# Patient Record
Sex: FEMALE | Race: BLACK OR AFRICAN AMERICAN | URBAN - METROPOLITAN AREA
[De-identification: names, ages, dates, MRNs, and addresses within clinical notes are randomized per-mention and may not be internally consistent; named-entity substitution may affect disease eponyms.]

---

## 2019-07-28 NOTE — PROGRESS NOTES
2019  Patient Name: Arjun Mahmood  : 1975  Medical Record: [id number]    MEDICATIONS  Current Outpatient Medications   Medication Sig Dispense Refill    fenofibrate (TRICOR) 145 MG tablet Take 145 mg by mouth daily      ibuprofen (ADVIL;MOTRIN) 200 MG tablet Take 600 mg by mouth every 8 hours as needed for Pain      folic acid (FOLVITE) 1 MG tablet Take 1 mg by mouth daily      levothyroxine (SYNTHROID) 50 MCG tablet Take 50 mcg by mouth Daily       No current facility-administered medications for this visit. ALLERGIES  Allergies   Allergen Reactions    Iv Contrast [Iodides] Rash         Comments  No specialty comments available. REFERRING PHYSICIAN: Zachary Fry MD     HISTORY OF PRESENT ILLNESS  Arjun Mahmood is a 53 y.o. female with past medical history of hypothyroidism, dyslipidemia, anemia, pleurisy, pulmonary embolism who is being seen for follow up evaluation of  bilateral lower extremity pain and positive rheumatoid factor. She has pain in both lower extremities and bilateral feet which started 2 years ago and is progressively getting worse. She describes her pain as 10 out of 10, aching/sore, on daily basis, without any significant relieving or aggravating factors. She also has on and off low back pain twice a week lasting for 20 minutes at a time. Pain intermittently radiates to right thigh. She has occasional tingling and numbness in legs. She denies any weakness. She denies any bowel or bladder dysfunction. She denies any change in the symptoms with exercise or rest.  She was placed on Cymbalta by primary care physician which caused jitteriness. She takes ibuprofen over-the-counter 600 mg as needed without any significant relief in pain. She gets occasional achiness in her hands with overactivity. She denies any swelling. She has a morning stiffness lasting for few minutes. She has intermittent swelling in her ankles.   She also has a history of pulmonary Date    Anemia     Pleurisy     Pulmonary embolism ([facility] [address]     Thyroid disease      Past Surgical History:   Procedure Laterality Date    MOUTH SURGERY      TUBAL LIGATION       Social History     Socioeconomic History    Marital status: Single     Spouse name: Not on file    Number of children: Not on file    Years of education: Not on file    Highest education level: Not on file   Occupational History    Not on file   Social Needs    Financial resource strain: Not on file    Food insecurity:     Worry: Not on file     Inability: Not on file    Transportation needs:     Medical: Not on file     Non-medical: Not on file   Tobacco Use    Smoking status: Former Smoker     Types: Cigarettes     Last attempt to quit: 7/10/2019     Years since quittin.0    Smokeless tobacco: Never Used    Tobacco comment: max of 3 cigs per wk   Substance and Sexual Activity    Alcohol use: Yes     Comment: occ    Drug use: No    Sexual activity: Not on file   Lifestyle    Physical activity:     Days per week: Not on file     Minutes per session: Not on file    Stress: Not on file   Relationships    Social connections:     Talks on phone: Not on file     Gets together: Not on file     Attends [employer] service: Not on file     Active member of club or organization: Not on file     Attends meetings of clubs or organizations: Not on file     Relationship status: Not on file    Intimate partner violence:     Fear of current or ex partner: Not on file     Emotionally abused: Not on file     Physically abused: Not on file     Forced sexual activity: Not on file   Other Topics Concern    Not on file   Social History Narrative    Not on file     History reviewed. No pertinent family history.       PHYSICAL EXAM   Vitals:    19 1355   BP: 132/87   Site: Left Lower Arm   Pulse: 60   Weight: 188 lb (85.3 kg)   Height: 5' 5.5\" (1.664 m)     Physical Exam  Constitutional:  Well developed, well nourished, no acute telangiectasias  Lymphatic:  No lymphadenopathy noted   Neurologic:   Alert & oriented x 3, CN 2-12 normal, no focal deficits noted. Sensations Intact. Muscle strength 5/5 proximallyand distally in upper and lower extremities. Psychiatric:  Speech and behavior appropriate           LABS AND IMAGING  Outside data reviewed and in HPI    Lab Results   Component Value Date    WBC 5.9 09/09/2016    RBC 3.98 09/09/2016    HGB 11.7 09/09/2016    HCT 35.4 09/09/2016     09/09/2016    MCV 89.1 09/09/2016    MCH 29.4 09/09/2016    MCHC 33.0 09/09/2016    RDW 14.3 09/09/2016    LYMPHOPCT 29.0 09/09/2016    MONOPCT 7.9 09/09/2016    BASOPCT 0.7 09/09/2016    MONOSABS 0.5 09/09/2016    LYMPHSABS 1.7 09/09/2016    EOSABS 0.2 09/09/2016    BASOSABS 0.0 09/09/2016       Chemistry        Component Value Date/Time     09/09/2016 2237    K 3.7 09/09/2016 2237     09/09/2016 2237    CO2 23 09/09/2016 2237    BUN 13 09/09/2016 2237    CREATININE 0.9 09/09/2016 2237        Component Value Date/Time    CALCIUM 10.1 09/09/2016 2237    ALKPHOS 58 05/03/2016 1630    AST 19 05/03/2016 1630    ALT 10 05/03/2016 1630    BILITOT <0.2 05/03/2016 1630          No results found for: SEDRATE  No results found for: CRP  No results found for: JAY, SURYA, SSA, SSB, C3, C4  No results found for: RF, CCPABIGG  No results found for: JAY, ANATITER, ANAINT, PATH  No results found for: DSDNAG, DSDNAIGGIFA  No results found for: SSAROAB, SSALAAB  No results found for: SMAB, RNPAB  No results found for: CENTABIGG  No results found for: C3, C4, ACE  No results found for: JO1, VITD25, ABR26VEQPH  No results found for: Deny Stefano  No results found for: [id number]  No results found for: TSHFT4, TSH  No results found for: VITD25    Radiology:    Bone scan 5/13/19  IMPRESSION:    1.  Symmetric radiotracer activity in the rib cage as well as along the cervical, thoracic, and lumbar spine.  No clear etiology for the patient's chest pain is pain syndrome  -Bone scan with mild degenerative changes without any evidence of inflammation  -Bilateral lower extremity EMGs normal  -I will do work-up to rule out metabolic, myopathy, autoimmune etiology  -Obtain lumbar spine x-ray to rule out degenerative disc disease/spinal stenosis  -Continue ibuprofen 600 mg as needed in the meantime  -     CBC Auto Differential; Future  -     Comprehensive Metabolic Panel; Future  -     C-Reactive Protein; Future  -     Sedimentation Rate; Future  -     CK; Future  -     TSH WITH REFLEX TO FT4; Future  -     Vitamin B12; Future  -     Vitamin D 25 Hydroxy; Future  -     XR LUMBAR SPINE (2-3 VIEWS); Future         The patient indicates understanding of these issues and agrees with the plan. Return in about 3 weeks (around 8/19/2019). The risks and benefits of my recommendations, as well as other treatment options, benefits and side effects werediscussed with the patient. All questions were answered. I reviewed patient's history, referral documents and electronic medical records  Copy of consult note is being routedelectronically/faxed to referring physician         ######################################################################    I thank you for giving me theopportunity to participate in [facility] care. If you have any questions or concerns please feel free to contact me. I look forward to following  Ame along with you. Electronically signed by: Chandni Browne MD, 7/29/2019 2:28 PM    Documentation was done using voice recognition dragon software. Every effort was made to ensure accuracy;however, inadvertent unintentional computerized transcription errors may be present.

## 2019-07-29 ENCOUNTER — OFFICE VISIT (OUTPATIENT)
Dept: RHEUMATOLOGY | Age: 44
End: 2019-07-29
Payer: COMMERCIAL

## 2019-07-29 VITALS
DIASTOLIC BLOOD PRESSURE: 87 MMHG | SYSTOLIC BLOOD PRESSURE: 132 MMHG | HEART RATE: 60 BPM | BODY MASS INDEX: 30.22 KG/M2 | WEIGHT: 188 LBS | HEIGHT: 66 IN

## 2019-07-29 DIAGNOSIS — M79.604 PAIN IN BOTH LOWER EXTREMITIES: ICD-10-CM

## 2019-07-29 DIAGNOSIS — R76.8 ELEVATED RHEUMATOID FACTOR: ICD-10-CM

## 2019-07-29 DIAGNOSIS — M79.605 PAIN IN BOTH LOWER EXTREMITIES: ICD-10-CM

## 2019-07-29 DIAGNOSIS — R76.8 ELEVATED RHEUMATOID FACTOR: Primary | ICD-10-CM

## 2019-07-29 PROCEDURE — G8417 CALC BMI ABV UP PARAM F/U: HCPCS | Performed by: INTERNAL MEDICINE

## 2019-07-29 PROCEDURE — G8427 DOCREV CUR MEDS BY ELIG CLIN: HCPCS | Performed by: INTERNAL MEDICINE

## 2019-07-29 PROCEDURE — 99244 OFF/OP CNSLTJ NEW/EST MOD 40: CPT | Performed by: INTERNAL MEDICINE

## 2019-07-29 RX ORDER — IBUPROFEN 200 MG
600 TABLET ORAL EVERY 8 HOURS PRN
COMMUNITY
End: 2019-11-11

## 2019-07-29 RX ORDER — FOLIC ACID 1 MG/1
1 TABLET ORAL DAILY
COMMUNITY
End: 2021-10-11 | Stop reason: SDUPTHER

## 2019-07-29 RX ORDER — FENOFIBRATE 145 MG/1
145 TABLET, COATED ORAL DAILY
COMMUNITY

## 2019-07-29 NOTE — LETTER
[facility] Rheumat[facility]  [address] [id number]  Phone: [phone]  Fax: [phone]    Brent Escobedo MD        July 29, 2019     Patient: Bethanie Tejada  MR Number: [id number]  YOB: 1975  Date of Visit: 7/29/2019    Dear Dr. Tanya Simmons: Thank you for your referral. Progress note attached in visit summary. If you have questions, please do not hesitate to call me. I look forward to following Ame along with you.     Sincerely,        Brent Escobedo MD

## 2019-07-30 DIAGNOSIS — E55.9 VITAMIN D DEFICIENCY: Primary | ICD-10-CM

## 2019-07-30 LAB
A/G RATIO: 1.6 (ref 1.1–2.2)
ALBUMIN SERPL-MCNC: 4.1 G/DL (ref 3.4–5)
ALP BLD-CCNC: 81 U/L (ref 40–129)
ALT SERPL-CCNC: 7 U/L (ref 10–40)
ANION GAP SERPL CALCULATED.3IONS-SCNC: 15 MMOL/L (ref 3–16)
ANTI-NUCLEAR ANTIBODY (ANA): NEGATIVE
ANTI-SS-A IGG: <0.2 AI (ref 0–0.9)
ANTI-SS-B IGG: <0.2 AI (ref 0–0.9)
AST SERPL-CCNC: 13 U/L (ref 15–37)
BASOPHILS ABSOLUTE: 0 K/UL (ref 0–0.2)
BASOPHILS RELATIVE PERCENT: 0.8 %
BILIRUB SERPL-MCNC: <0.2 MG/DL (ref 0–1)
BUN BLDV-MCNC: 18 MG/DL (ref 7–20)
C-REACTIVE PROTEIN: 1.6 MG/L (ref 0–5.1)
CALCIUM SERPL-MCNC: 10.1 MG/DL (ref 8.3–10.6)
CHLORIDE BLD-SCNC: 109 MMOL/L (ref 99–110)
CO2: 21 MMOL/L (ref 21–32)
CREAT SERPL-MCNC: 1 MG/DL (ref 0.6–1.1)
CYCLIC CITRULLINATED PEPTIDE ANTIBODY IGG: 11.2 U/ML (ref 0–2.9)
EOSINOPHILS ABSOLUTE: 0.4 K/UL (ref 0–0.6)
EOSINOPHILS RELATIVE PERCENT: 6.9 %
GFR AFRICAN AMERICAN: >60
GFR NON-AFRICAN AMERICAN: 60
GLOBULIN: 2.5 G/DL
GLUCOSE BLD-MCNC: 94 MG/DL (ref 70–99)
HCT VFR BLD CALC: 34.6 % (ref 36–48)
HEMOGLOBIN: 11.5 G/DL (ref 12–16)
HEPATITIS B CORE IGM ANTIBODY: NORMAL
HEPATITIS B SURFACE ANTIGEN INTERPRETATION: NORMAL
HEPATITIS C ANTIBODY INTERPRETATION: NORMAL
LYMPHOCYTES ABSOLUTE: 1.9 K/UL (ref 1–5.1)
LYMPHOCYTES RELATIVE PERCENT: 35.8 %
MCH RBC QN AUTO: 30.5 PG (ref 26–34)
MCHC RBC AUTO-ENTMCNC: 33.2 G/DL (ref 31–36)
MCV RBC AUTO: 91.9 FL (ref 80–100)
MONOCYTES ABSOLUTE: 0.4 K/UL (ref 0–1.3)
MONOCYTES RELATIVE PERCENT: 8.1 %
NEUTROPHILS ABSOLUTE: 2.6 K/UL (ref 1.7–7.7)
NEUTROPHILS RELATIVE PERCENT: 48.4 %
PDW BLD-RTO: 12.7 % (ref 12.4–15.4)
PLATELET # BLD: 383 K/UL (ref 135–450)
PMV BLD AUTO: 8.1 FL (ref 5–10.5)
POTASSIUM SERPL-SCNC: 4.5 MMOL/L (ref 3.5–5.1)
RBC # BLD: 3.77 M/UL (ref 4–5.2)
RHEUMATOID FACTOR: 44 IU/ML
SEDIMENTATION RATE, ERYTHROCYTE: 24 MM/HR (ref 0–20)
SODIUM BLD-SCNC: 145 MMOL/L (ref 136–145)
TOTAL CK: 113 U/L (ref 26–192)
TSH REFLEX FT4: 0.6 UIU/ML (ref 0.27–4.2)
VITAMIN B-12: 1123 PG/ML (ref 211–911)
VITAMIN D 25-HYDROXY: 9.9 NG/ML
WBC # BLD: 5.4 K/UL (ref 4–11)

## 2019-07-30 RX ORDER — ERGOCALCIFEROL 1.25 MG/1
50000 CAPSULE ORAL WEEKLY
Qty: 4 CAPSULE | Refills: 2 | Status: SHIPPED | OUTPATIENT
Start: 2019-07-30 | End: 2019-07-31 | Stop reason: SDUPTHER

## 2019-07-30 RX ORDER — CHOLECALCIFEROL (VITAMIN D3) 50 MCG
2000 TABLET ORAL DAILY
Qty: 30 TABLET | Refills: 11 | Status: SHIPPED | OUTPATIENT
Start: 2019-07-30 | End: 2019-07-31 | Stop reason: SDUPTHER

## 2019-07-31 ENCOUNTER — TELEPHONE (OUTPATIENT)
Dept: RHEUMATOLOGY | Age: 44
End: 2019-07-31

## 2019-07-31 DIAGNOSIS — E55.9 VITAMIN D DEFICIENCY: ICD-10-CM

## 2019-07-31 LAB
ALBUMIN SERPL-MCNC: 3.2 G/DL (ref 3.1–4.9)
ALPHA-1-GLOBULIN: 0.2 G/DL (ref 0.2–0.4)
ALPHA-2-GLOBULIN: 1 G/DL (ref 0.4–1.1)
BETA GLOBULIN: 1.2 G/DL (ref 0.9–1.6)
GAMMA GLOBULIN: 1.1 G/DL (ref 0.6–1.8)
SPE/IFE INTERPRETATION: NORMAL
TOTAL PROTEIN: 6.6 G/DL (ref 6.4–8.2)

## 2019-07-31 RX ORDER — CHOLECALCIFEROL (VITAMIN D3) 50 MCG
2000 TABLET ORAL DAILY
Qty: 30 TABLET | Refills: 11 | Status: SHIPPED | OUTPATIENT
Start: 2019-10-23 | End: 2020-08-18

## 2019-07-31 RX ORDER — ERGOCALCIFEROL 1.25 MG/1
50000 CAPSULE ORAL WEEKLY
Qty: 4 CAPSULE | Refills: 2 | Status: SHIPPED | OUTPATIENT
Start: 2019-07-31 | End: 2020-04-06

## 2019-07-31 NOTE — TELEPHONE ENCOUNTER
Notes recorded by Mariluz Norris on 7/31/2019 at 4:20 PM EDT  Pt informed. Pended Rx's to alternated pharmacy per pt req  ------    Notes recorded by Mariluz Norris on 7/31/2019 at 7:20 AM EDT  [facility])    Notes recorded by Anjelica Del Toro MD on 7/29/2019 at 10:17 PM EDT  Lumbar spine xrays show mild degenerative changes  ------    Notes recorded by Anjelica Del Toro MD on 7/30/2019 at 9:30 AM EDT  Please call the patient and let her know that her blood work is consistent with rheumatoid arthritis. Tere Zaman will discuss treatment options at follow-up appointment. Janneth Dacosta also has vitamin D deficiency.  I will send in a prescription to pharmacy for vitamin D replacement.

## 2019-08-07 ENCOUNTER — TELEPHONE (OUTPATIENT)
Dept: RHEUMATOLOGY | Age: 44
End: 2019-08-07

## 2019-08-18 NOTE — PROGRESS NOTES
or bladder dysfunction. She denies any change in the symptoms with exercise or rest.  She was placed on Cymbalta by primary care physician which caused jitteriness. She takes ibuprofen over-the-counter 600 mg as needed without any significant relief in pain. She gets occasional achiness in her hands with overactivity. She denies any swelling. She has a morning stiffness lasting for few minutes. She has intermittent swelling in her ankles. She also has a history of pulmonary embolism in 2015. She was on warfarin for 2 years. She denies family history of rheumatoid arthritis. She denies psoriasis, inflammatory bowel disease, inflammatory back pain, dactylitis, enthesitis, tenosynovitis and uveitis. She had bilateral lower extremity EMG which was normal.  She also had bone scan which showed mild degenerative changes. She had blood work which showed RF 44.8    Interim history: She presents for follow-up of joint pain. She continues to complain of pain in her feet and legs. She denies any weakness in the legs. She had lumbar spine x-ray which showed mild degenerative changes. She denies any joint swelling. She has morning stiffness lasting for few minutes. She tried Cymbalta for leg pain without any benefit. She had EMG of bilateral lower extremities which was normal.  She also had a bone scan which showed mild degenerative changes. Her blood work showed positive RF, CCP and low vitamin D. She is on vitamin D replacement. HPI  Review of Systems    REVIEW OF SYSTEMS: History of pulmonary embolism, pleurisy  Constitutional: No unanticipated weight loss or fevers. No fatigue and malaise.   Integumentary: No rash, photosensitivity, malar rash, livedo reticularis, alopecia and Raynaud's symptoms, sclerodactyly, skin tightening  Eyes: negative for visual disturbance and persistent redness, discharge from eyes   ENT: - No tinnitus, loss of hearing, vertigo, or recurrent ear infections.  - No history of nasal/oral ulcers. - No history of dry eyes/dry mouth  Cardiovascular: No history of pericarditis, chest pain or murmur or palpitations  Respiratory: No shortness of breath, cough or history of interstitial lung disease. No history of tuberculosis or atypical infections. Gastrointestinal: No history of heart burn, dysphagia or esophageal dysmotility. No change in bowel habits or any inflammatory bowel disease. Genitourinary: No history renal disease, miscarriages. Hematologic/Lymphatic: No abnormal bruising or bleeding, swollen lymph nodes. Neurological: No history of headaches, seizure or focal weakness. No history of neuropathies, paresthesias or hyperesthesias, facial droop, diplopia  Psychiatric: No history of bipolar disease, anxiety, depression  Endocrine: Denies any polyuria, polydipsia and osteoporosis  Allergic/Immunologic: No nasal congestion or hives. I have reviewed patients Past medical History, Social History and Family History as mentioned in her chart and this remains unchanged fromprevious.     Past Medical History:   Diagnosis Date    Anemia     Pleurisy     Pulmonary embolism ([facility] [address]     Thyroid disease      Past Surgical History:   Procedure Laterality Date    MOUTH SURGERY      TUBAL LIGATION       Social History     Socioeconomic History    Marital status: Single     Spouse name: Not on file    Number of children: Not on file    Years of education: Not on file    Highest education level: Not on file   Occupational History    Not on file   Social Needs    Financial resource strain: Not on file    Food insecurity:     Worry: Not on file     Inability: Not on file    Transportation needs:     Medical: Not on file     Non-medical: Not on file   Tobacco Use    Smoking status: Former Smoker     Types: Cigarettes     Last attempt to quit: 7/10/2019     Years since quittin.1    Smokeless tobacco: Never Used    Tobacco comment: max of 3 cigs per wk   Substance and Sexual

## 2019-08-19 ENCOUNTER — OFFICE VISIT (OUTPATIENT)
Dept: RHEUMATOLOGY | Age: 44
End: 2019-08-19
Payer: COMMERCIAL

## 2019-08-19 VITALS
BODY MASS INDEX: 29.89 KG/M2 | HEIGHT: 66 IN | HEART RATE: 81 BPM | SYSTOLIC BLOOD PRESSURE: 133 MMHG | DIASTOLIC BLOOD PRESSURE: 84 MMHG | WEIGHT: 186 LBS

## 2019-08-19 DIAGNOSIS — M79.18 MYOFASCIAL PAIN: ICD-10-CM

## 2019-08-19 DIAGNOSIS — M05.79 RHEUMATOID ARTHRITIS INVOLVING MULTIPLE SITES WITH POSITIVE RHEUMATOID FACTOR (HCC): Primary | ICD-10-CM

## 2019-08-19 PROCEDURE — 1036F TOBACCO NON-USER: CPT | Performed by: INTERNAL MEDICINE

## 2019-08-19 PROCEDURE — G8427 DOCREV CUR MEDS BY ELIG CLIN: HCPCS | Performed by: INTERNAL MEDICINE

## 2019-08-19 PROCEDURE — G8417 CALC BMI ABV UP PARAM F/U: HCPCS | Performed by: INTERNAL MEDICINE

## 2019-08-19 PROCEDURE — 99214 OFFICE O/P EST MOD 30 MIN: CPT | Performed by: INTERNAL MEDICINE

## 2019-08-19 RX ORDER — HYDROXYCHLOROQUINE SULFATE 200 MG/1
200 TABLET, FILM COATED ORAL 2 TIMES DAILY
Qty: 60 TABLET | Refills: 5 | Status: SHIPPED | OUTPATIENT
Start: 2019-08-19 | End: 2019-11-11 | Stop reason: SDUPTHER

## 2019-08-19 RX ORDER — PREDNISONE 1 MG/1
TABLET ORAL
Qty: 105 TABLET | Refills: 0 | Status: SHIPPED | OUTPATIENT
Start: 2019-08-19 | End: 2019-11-11

## 2019-09-04 ENCOUNTER — HOSPITAL ENCOUNTER (OUTPATIENT)
Dept: PHYSICAL THERAPY | Age: 44
Setting detail: THERAPIES SERIES
Discharge: HOME | End: 2019-09-04
Payer: COMMERCIAL

## 2019-09-04 PROCEDURE — 97110 THERAPEUTIC EXERCISES: CPT

## 2019-09-04 PROCEDURE — 97530 THERAPEUTIC ACTIVITIES: CPT

## 2019-09-04 PROCEDURE — 97161 PT EVAL LOW COMPLEX 20 MIN: CPT

## 2019-09-04 ASSESSMENT — PAIN DESCRIPTION - FREQUENCY: FREQUENCY: CONTINUOUS

## 2019-09-04 ASSESSMENT — PAIN DESCRIPTION - ORIENTATION: ORIENTATION: RIGHT;LEFT

## 2019-09-04 ASSESSMENT — PAIN DESCRIPTION - LOCATION: LOCATION: LEG

## 2019-09-04 ASSESSMENT — PAIN DESCRIPTION - PROGRESSION: CLINICAL_PROGRESSION: GRADUALLY WORSENING

## 2019-09-04 ASSESSMENT — PAIN DESCRIPTION - PAIN TYPE: TYPE: CHRONIC PAIN

## 2019-09-04 ASSESSMENT — PAIN DESCRIPTION - DESCRIPTORS: DESCRIPTORS: ACHING;CONSTANT;BURNING

## 2019-09-04 ASSESSMENT — PAIN SCALES - GENERAL: PAINLEVEL_OUTOF10: 10

## 2019-09-04 ASSESSMENT — PAIN DESCRIPTION - DIRECTION: RADIATING_TOWARDS: BILATERAL FEET

## 2019-09-04 NOTE — FLOWSHEET NOTE
Circles X   Squats  X PNF Diagonals    Hamstring Curls X Wall Push Ups X   Hip Flexion (SLR) X Figure 8's    Hip aBduction (SLR) X Bilateral Pull Downs    Hip Extension (SLR) X      Hip aDduction (SLR)      Hip Circles X Functional:    Hip IR/Er X Step up forward X   TrA Set  X Step up lateral  X   Pelvic Tilts   Step down     Fig 8's  X Lunges Forward X   LE PNF  Lunges Retro      Lunges Lateral     Balance:   Lower ab curl with noodle     SLS  X      Tandem Stance X      NBOS eyes open  Seated:     NBOS eyes closed  Ankle pumps     Hand to Opposite Knee X Ankle Circles     Fwd Step ups to SLS  Knee Flex/Ext    Lateral Step ups to SLS  Hip aBd/aDd    Stop/Go Gait   Bicycle       Ankle DF/PF      Ankle Inv/Ev    Stretching:       Gastroc/Soleus X     Hamstring  X Noodle:     Knee Flex Stretch  Leg Press X   Piriformis  X Noodle Hang at [place]    Hip Flexor  Noodle Hang Deep Water X   SKTC X Noodle Bicycle     DKTC       ITB      Quad  Deep Water:    Mid Back   Jog    UT  Jumping Jacks    Post Shoulder  Heel to Toe    Ladder Pull  Hand to Opposite Knee    Pec Stretch  Rocking Horse      [employer] Skier          Cervical:   Other:     AROM Flexion      AROM Extension      AROM Side Bending      AROM Rotation      Chin Tucks      Chin Nods        Aquatic Abbreviation Key  B= Belt DB= Dumbells T= Theratube   H= Hydrotone N= Noodles W= Weights   P= Paddles S= Speedo equipment K= Kickboard       Pt. Education:  -pt educated on diagnosis, prognosis and expectations for rehab  -all pt questions were answered  -Gave pt tour of pool, explained how to use lockers, what to wear, that it would be in group setting, and showed pt aquatic equipment.     HEP instruction:  -pt provided with written and illustrated instructions for HEP (see scanned image in )  9/4  SKTC, piriformis stretch, supine hamstring stretch, standing hamstring stretch, bridging, prone press up    Therapeutic Exercise and NMR EXR  [] (14751) Provided verbal/tactile cueing for activities related to strengthening, flexibility, endurance, ROM for improvements in  [] LE / Lumbar: LE, proximal hip, and core control with self care, mobility, lifting, ambulation. [] UE / Cervical: cervical, postural, scapular, scapulothoracic and UE control with self care, reaching, carrying, lifting, house/yardwork, driving, computer work.  [] (10305) Provided verbal/tactile cueing for activities related to improving balance, coordination, kinesthetic sense, posture, motor skill, proprioception to assist with   [] LE / lumbar: LE, proximal hip, and core control in self care, mobility, lifting, ambulation and eccentric single leg control. [] UE / cervical: cervical, scapular, scapulothoracic and UE control with self care, reaching, carrying, lifting, house/yardwork, driving, computer work.   [] (22338) Aquatic therapy with therapeutic exercise. Provided verbal and tactile cueing for activities related to strengthening, flexibility, endurance, ROM for improvements in  [] LE / lumbar: LE, proximal hip, and core control in self care, mobility, lifting, ambulation and eccentric single leg control. [] UE / cervical: cervical, scapular, scapulothoracic and UE control with self care, reaching, carrying, lifting, house/yardwork, driving, computer work.   [] (08517) Therapist is in constant attendance of 2 or more patients providing skilled therapy interventions, but not providing any significant amount of measurable one-on-one time to either patient, for improvements in  [] LE / lumbar: LE, proximal hip, and core control in self care, mobility, lifting, ambulation and eccentric single leg control. [] UE / cervical: cervical, scapular, scapulothoracic and UE control with self care, reaching, carrying, lifting, house/yardwork, driving, computer work.      NMR and Therapeutic Activities:    [] (71378 or ) Provided verbal/tactile cueing for activities related to improving progress  [] Patient is not progressing as expected and requires additional follow up with physician  [] Other    Persisting Functional Limitations/Impairments:  []Sleeping [x]Sitting               [x]Standing []Transfers        []Walking []Kneeling               []Stairs [x]Squatting / bending   [x]ADLs [x]Reaching  [x]Lifting  [x]Housework  []Driving []Job related tasks  []Sports/Recreation []Other:        ASSESSMENT:  See eval  Treatment/Activity Tolerance:  [] Patient able to complete tx [] Patient limited by fatique  [] Patient limited by pain  [] Patient limited by other medical complications  [] Other:     Prognosis: [x] Good [] Fair  [] Poor    Patient Requires Follow-up: [x] Yes  [] No         PLAN: See eval. PT 2x / week for 8 weeks. [] Continue per plan of care [] Alter current plan (see comments)  [x] Plan of care initiated [] Hold pending MD visit [] Discharge    Electronically signed by: Christian Benavides PT    Note: If patient does not return for scheduled/ recommended follow up visits, his note will serve as a discharge from care along with most recent update on progress.

## 2019-09-11 ENCOUNTER — HOSPITAL ENCOUNTER (OUTPATIENT)
Dept: PHYSICAL THERAPY | Age: 44
Setting detail: THERAPIES SERIES
Discharge: HOME | End: 2019-09-11
Payer: COMMERCIAL

## 2019-09-11 PROCEDURE — 97150 GROUP THERAPEUTIC PROCEDURES: CPT

## 2019-09-11 PROCEDURE — 97113 AQUATIC THERAPY/EXERCISES: CPT

## 2019-09-11 NOTE — FLOWSHEET NOTE
[facility] - Outpatient Physical Therapy    Physical Therapy Daily Treatment Note  Date:  2019    Patient Name:  Jessica Herrmann    :  1975  MRN: [id number]  Medical/Treatment Diagnosis Information:  · Diagnosis: Myofascial Pain M79.18  · Treatment Diagnosis: Impaired LE strength, increased pain, decreased tolerance standing, sitting, myofascial pain   Insurance/Certification information:  PT Insurance Information: [employer] 30 visits/year   Physician Information:  Referring Practitioner: Sirena Velazquez  Plan of care signed (Y/N):     Date of Patient follow up with Physician:     Functional scale: LEFS    Progress Note: [x]  Yes  []  No  Next due by: Visit #10  (land)     Latex Allergy:  [x]NO      []YES  Preferred Language for Healthcare:   [x]English       []other:    Visit # Insurance Allowable Date Range (if applicable)   0/33  30 n/a     Pain level:  4/10 BILATERAL LEGS     SUBJECTIVE: feeling pretty good today. OBJECTIVE: See eval      RESTRICTIONS/PRECAUTIONS:     Land Based Treatment Dates:   Exercises/Interventions:     Therapeutic Exercises () Resistance / level Sets/sec Reps Notes                                                                         Therapeutic Activities (09634)                                   Neuromuscular Re-ed (09648)                                                 Manual Intervention (77030)                                                     AquaticTherapy Dates of Service:   Aquatic Visits Exercises/Activities:,  [id number] and / or 37606   Transfers:          % Immersion:            Ambulation:   UE Exercises:       Forwards   X 1 lap Shoulder Shrugs      Lateral   X 1 lap Shoulder Circles      Retro   X 1 lap Scapular Retraction       Marching   Push Downs       [place]   Punching     Jog    Rowing     Multifidi walkouts with paddle  X Elbow Flex/Ext       Shldr Flex/Ext X 10      Shldr aBd/aDd X 10   LE Exercises:  [employer] stretch, standing hamstring stretch, bridging, prone press up    Therapeutic Exercise and NMR EXR  [] (12118) Provided verbal/tactile cueing for activities related to strengthening, flexibility, endurance, ROM for improvements in  [] LE / Lumbar: LE, proximal hip, and core control with self care, mobility, lifting, ambulation. [] UE / Cervical: cervical, postural, scapular, scapulothoracic and UE control with self care, reaching, carrying, lifting, house/yardwork, driving, computer work.  [] (22987) Provided verbal/tactile cueing for activities related to improving balance, coordination, kinesthetic sense, posture, motor skill, proprioception to assist with   [] LE / lumbar: LE, proximal hip, and core control in self care, mobility, lifting, ambulation and eccentric single leg control. [] UE / cervical: cervical, scapular, scapulothoracic and UE control with self care, reaching, carrying, lifting, house/yardwork, driving, computer work. [x] (01917) Aquatic therapy with therapeutic exercise. Provided verbal and tactile cueing for activities related to strengthening, flexibility, endurance, ROM for improvements in  [x] LE / lumbar: LE, proximal hip, and core control in self care, mobility, lifting, ambulation and eccentric single leg control. [] UE / cervical: cervical, scapular, scapulothoracic and UE control with self care, reaching, carrying, lifting, house/yardwork, driving, computer work. [x] (60854) Therapist is in constant attendance of 2 or more patients providing skilled therapy interventions, but not providing any significant amount of measurable one-on-one time to either patient, for improvements in  [x] LE / lumbar: LE, proximal hip, and core control in self care, mobility, lifting, ambulation and eccentric single leg control. [] UE / cervical: cervical, scapular, scapulothoracic and UE control with self care, reaching, carrying, lifting, house/yardwork, driving, computer work.      NMR and with   [] LE / lumbar: self care, mobility, lifting and ambulation. [] UE / Cervical: self care, reaching, carrying, lifting, house/yardwork, driving, computer work. Modalities:  [] (40587) Vasopneumatic compression: Utilized vasopneumatic compression to decrease edema / swelling for the purpose of improving mobility and quad tone / recruitment which will allow for increased overall function including but not limited to self-care, transfers, ambulation, and ascending / descending stairs. Modalities:      Charges:  Timed Code Treatment Minutes: 15   Total Treatment Minutes: 34     [] EVAL - LOW (83182)   [] EVAL - MOD (48551)  [] EVAL - HIGH (77693)  [] RE-EVAL (00829)  [] TE (05179) x 1     [] Ionto  [] NMR (59393) x      [] Vaso  [] Manual (05476) x      [] Ultrasound  [] TA x   1    [] Mech Traction (72596)  [] Gait Training x     [] ES (un) (81933):   [x] Aquatic therapy x   [] Other:   [x] Group:     GOALS: Long term goals  Time Frame for Long term goals : upon discharge  Long term goal 1: Pt will be IND with general debility HEP protocol with reference to written instructions, to achieve maximal level of functional mobility and independence   Long term goal 2: Pt will reduce pain to 5/10 at worst in LE and back in order to help allow patient to improve tolerance to performing daily activities around her home  Long term goal 3: Pt will improve single leg stance RLE to 20 secs in order to help reduce risk for falls and risk for LE buckling   Long term goal 4: Pt will improve tolerance to sitting/standing to 1 hour continuously in order to allow patient to return to all ADL's, iADL's within her home without limitations     Overall Progression Towards Functional goals/ Treatment Progress Update:  [] Patient is progressing as expected towards functional goals listed. [] Progression is slowed due to complexities/Impairments listed. [] Progression has been slowed due to co-morbidities.   [x] Plan just implemented, too soon to assess goals progression <30days   [] Goals require adjustment due to lack of progress  [] Patient is not progressing as expected and requires additional follow up with physician  [] Other    Persisting Functional Limitations/Impairments:  []Sleeping [x]Sitting               [x]Standing []Transfers        []Walking []Kneeling               []Stairs [x]Squatting / bending   [x]ADLs [x]Reaching  [x]Lifting  [x]Housework  []Driving []Job related tasks  []Sports/Recreation []Other:        ASSESSMENT:  See eval  Treatment/Activity Tolerance:  [] Patient able to complete tx [] Patient limited by fatique  [] Patient limited by pain  [] Patient limited by other medical complications  [] Other:     Prognosis: [x] Good [] Fair  [] Poor    Patient Requires Follow-up: [x] Yes  [] No         PLAN: See eval. PT 2x / week for 8 weeks. [x] Continue per plan of care [] Alter current plan (see comments)  [] Plan of care initiated [] Hold pending MD visit [] Discharge    Electronically signed by: Juan Ramon Arboleda PT    Note: If patient does not return for scheduled/ recommended follow up visits, his note will serve as a discharge from care along with most recent update on progress.

## 2019-09-17 ENCOUNTER — HOSPITAL ENCOUNTER (OUTPATIENT)
Dept: PHYSICAL THERAPY | Age: 44
Setting detail: THERAPIES SERIES
Discharge: HOME | End: 2019-09-17
Payer: COMMERCIAL

## 2019-09-19 ENCOUNTER — HOSPITAL ENCOUNTER (OUTPATIENT)
Dept: PHYSICAL THERAPY | Age: 44
Setting detail: THERAPIES SERIES
Discharge: HOME | End: 2019-09-19
Payer: COMMERCIAL

## 2019-09-19 PROCEDURE — 97150 GROUP THERAPEUTIC PROCEDURES: CPT

## 2019-09-19 PROCEDURE — 97113 AQUATIC THERAPY/EXERCISES: CPT

## 2019-09-19 NOTE — FLOWSHEET NOTE
computer work.      NMR and Therapeutic Activities:    [] (17982 or 40068) Provided verbal/tactile cueing for activities related to improving balance, coordination, kinesthetic sense, posture, motor skill, proprioception and motor activation to allow for proper function of   [] LE: / Lumbar core, proximal hip and LE with self care and ADLs  [] UE / Cervical: cervical, postural, scapular, scapulothoracic and UE control with self care, carrying, lifting, driving, computer work.   [] (80513) Gait Re-education- Provided training and instruction to the patient for proper LE, core and proximal hip recruitment and positioning and eccentric body weight control with ambulation re-education including up and down stairs     Home Exercise Program:    [] (36042) Reviewed/Progressed HEP activities related to strengthening, flexibility, endurance, ROM of   [] LE / Lumbar: core, proximal hip and LE for functional self-care, mobility, lifting and ambulation/stair navigation   [] UE / Cervical: cervical, postural, scapular, scapulothoracic and UE control with self care, reaching, carrying, lifting, house/yardwork, driving, computer work  [] (22421)Reviewed/Progressed HEP activities related to improving balance, coordination, kinesthetic sense, posture, motor skill, proprioception of   [] LE: core, proximal hip and LE for self care, mobility, lifting, and ambulation/stair navigation    [] UE / Cervical: cervical, postural,  scapular, scapulothoracic and UE control with self care, reaching, carrying, lifting, house/yardwork, driving, computer work    Manual Treatments:  PROM / STM / Oscillations-Mobs:  G-I, II, III, IV (PA's, Inf., Post.)  [] (39588) Provided manual therapy to mobilize LE, proximal hip and/or LS spine soft tissue/joints for the purpose of modulating pain, promoting relaxation,  increasing ROM, reducing/eliminating soft tissue swelling/inflammation/restriction, improving soft tissue extensibility and allowing for proper

## 2019-09-24 ENCOUNTER — HOSPITAL ENCOUNTER (OUTPATIENT)
Dept: PHYSICAL THERAPY | Age: 44
Setting detail: THERAPIES SERIES
Discharge: HOME | End: 2019-09-24
Payer: COMMERCIAL

## 2019-09-26 ENCOUNTER — HOSPITAL ENCOUNTER (OUTPATIENT)
Dept: PHYSICAL THERAPY | Age: 44
Setting detail: THERAPIES SERIES
Discharge: HOME | End: 2019-09-26
Payer: COMMERCIAL

## 2019-09-26 NOTE — PROGRESS NOTES
Physical Therapy  Cancellation/No-show Note  Patient Name:  Claude Boll  :  1975   Date:  2019  Cancelled visits to date: 2  No-shows to date: 1    Patient status for today's appointment patient:  []  [facility]  (pool),  (pool)  []  Rescheduled appointment  [x]  No-show  pool     Reason given by patient:  []  Patient ill  []  Conflicting appointment  []  No transportation    []  Conflict with work  [x]  No reason given  []  Other:     Comments:      Phone call information:   []  Phone call made today to patient at _ time at number provided:      []  Patient answered, conversation as follows:    []  Patient did not answer, message left as follows:  [x]  Phone call not made today    Electronically signed by:  Larisa Escamilla PT DPT

## 2019-10-01 ENCOUNTER — HOSPITAL ENCOUNTER (OUTPATIENT)
Dept: PHYSICAL THERAPY | Age: 44
Setting detail: THERAPIES SERIES
Discharge: HOME | End: 2019-10-01
Payer: COMMERCIAL

## 2019-10-01 PROCEDURE — 97150 GROUP THERAPEUTIC PROCEDURES: CPT

## 2019-10-01 PROCEDURE — 97113 AQUATIC THERAPY/EXERCISES: CPT

## 2019-10-03 ENCOUNTER — HOSPITAL ENCOUNTER (OUTPATIENT)
Dept: PHYSICAL THERAPY | Age: 44
Setting detail: THERAPIES SERIES
Discharge: HOME | End: 2019-10-03
Payer: COMMERCIAL

## 2019-10-08 ENCOUNTER — HOSPITAL ENCOUNTER (OUTPATIENT)
Dept: PHYSICAL THERAPY | Age: 44
Setting detail: THERAPIES SERIES
Discharge: HOME | End: 2019-10-08
Payer: COMMERCIAL

## 2019-10-10 ENCOUNTER — HOSPITAL ENCOUNTER (OUTPATIENT)
Dept: PHYSICAL THERAPY | Age: 44
Setting detail: THERAPIES SERIES
Discharge: HOME | End: 2019-10-10
Payer: COMMERCIAL

## 2019-10-17 ENCOUNTER — APPOINTMENT (OUTPATIENT)
Dept: PHYSICAL THERAPY | Age: 44
End: 2019-10-17
Payer: COMMERCIAL

## 2019-10-22 ENCOUNTER — APPOINTMENT (OUTPATIENT)
Dept: PHYSICAL THERAPY | Age: 44
End: 2019-10-22
Payer: COMMERCIAL

## 2019-10-24 ENCOUNTER — APPOINTMENT (OUTPATIENT)
Dept: PHYSICAL THERAPY | Age: 44
End: 2019-10-24
Payer: COMMERCIAL

## 2019-11-11 ENCOUNTER — OFFICE VISIT (OUTPATIENT)
Dept: RHEUMATOLOGY | Age: 44
End: 2019-11-11
Payer: COMMERCIAL

## 2019-11-11 VITALS
SYSTOLIC BLOOD PRESSURE: 122 MMHG | DIASTOLIC BLOOD PRESSURE: 80 MMHG | BODY MASS INDEX: 29.5 KG/M2 | HEART RATE: 63 BPM | WEIGHT: 180 LBS

## 2019-11-11 DIAGNOSIS — M05.79 RHEUMATOID ARTHRITIS INVOLVING MULTIPLE SITES WITH POSITIVE RHEUMATOID FACTOR (HCC): ICD-10-CM

## 2019-11-11 DIAGNOSIS — M05.79 RHEUMATOID ARTHRITIS INVOLVING MULTIPLE SITES WITH POSITIVE RHEUMATOID FACTOR (HCC): Primary | ICD-10-CM

## 2019-11-11 DIAGNOSIS — M70.62 TROCHANTERIC BURSITIS OF LEFT HIP: ICD-10-CM

## 2019-11-11 DIAGNOSIS — M79.18 MYOFASCIAL PAIN: ICD-10-CM

## 2019-11-11 LAB
A/G RATIO: 2 (ref 1.1–2.2)
ALBUMIN SERPL-MCNC: 4.6 G/DL (ref 3.4–5)
ALP BLD-CCNC: 90 U/L (ref 40–129)
ALT SERPL-CCNC: 8 U/L (ref 10–40)
ANION GAP SERPL CALCULATED.3IONS-SCNC: 15 MMOL/L (ref 3–16)
AST SERPL-CCNC: 13 U/L (ref 15–37)
BASOPHILS ABSOLUTE: 0.1 K/UL (ref 0–0.2)
BASOPHILS RELATIVE PERCENT: 1 %
BILIRUB SERPL-MCNC: <0.2 MG/DL (ref 0–1)
BUN BLDV-MCNC: 14 MG/DL (ref 7–20)
CALCIUM SERPL-MCNC: 9.9 MG/DL (ref 8.3–10.6)
CHLORIDE BLD-SCNC: 102 MMOL/L (ref 99–110)
CO2: 21 MMOL/L (ref 21–32)
CREAT SERPL-MCNC: 1.1 MG/DL (ref 0.6–1.1)
EOSINOPHILS ABSOLUTE: 0.2 K/UL (ref 0–0.6)
EOSINOPHILS RELATIVE PERCENT: 3.4 %
GFR AFRICAN AMERICAN: >60
GFR NON-AFRICAN AMERICAN: 53
GLOBULIN: 2.3 G/DL
GLUCOSE BLD-MCNC: 91 MG/DL (ref 70–99)
HCT VFR BLD CALC: 36.1 % (ref 36–48)
HEMOGLOBIN: 12 G/DL (ref 12–16)
LYMPHOCYTES ABSOLUTE: 1.5 K/UL (ref 1–5.1)
LYMPHOCYTES RELATIVE PERCENT: 26.5 %
MCH RBC QN AUTO: 30.4 PG (ref 26–34)
MCHC RBC AUTO-ENTMCNC: 33.3 G/DL (ref 31–36)
MCV RBC AUTO: 91.2 FL (ref 80–100)
MONOCYTES ABSOLUTE: 0.3 K/UL (ref 0–1.3)
MONOCYTES RELATIVE PERCENT: 5.9 %
NEUTROPHILS ABSOLUTE: 3.6 K/UL (ref 1.7–7.7)
NEUTROPHILS RELATIVE PERCENT: 63.2 %
PDW BLD-RTO: 12.9 % (ref 12.4–15.4)
PLATELET # BLD: 379 K/UL (ref 135–450)
PMV BLD AUTO: 8.2 FL (ref 5–10.5)
POTASSIUM SERPL-SCNC: 4.2 MMOL/L (ref 3.5–5.1)
RBC # BLD: 3.96 M/UL (ref 4–5.2)
SODIUM BLD-SCNC: 138 MMOL/L (ref 136–145)
TOTAL PROTEIN: 6.9 G/DL (ref 6.4–8.2)
WBC # BLD: 5.7 K/UL (ref 4–11)

## 2019-11-11 PROCEDURE — G8484 FLU IMMUNIZE NO ADMIN: HCPCS | Performed by: INTERNAL MEDICINE

## 2019-11-11 PROCEDURE — 99214 OFFICE O/P EST MOD 30 MIN: CPT | Performed by: INTERNAL MEDICINE

## 2019-11-11 PROCEDURE — 20610 DRAIN/INJ JOINT/BURSA W/O US: CPT | Performed by: INTERNAL MEDICINE

## 2019-11-11 PROCEDURE — 1036F TOBACCO NON-USER: CPT | Performed by: INTERNAL MEDICINE

## 2019-11-11 PROCEDURE — G8417 CALC BMI ABV UP PARAM F/U: HCPCS | Performed by: INTERNAL MEDICINE

## 2019-11-11 PROCEDURE — G8427 DOCREV CUR MEDS BY ELIG CLIN: HCPCS | Performed by: INTERNAL MEDICINE

## 2019-11-11 RX ORDER — TRIAMCINOLONE ACETONIDE 40 MG/ML
40 INJECTION, SUSPENSION INTRA-ARTICULAR; INTRAMUSCULAR ONCE
Status: COMPLETED | OUTPATIENT
Start: 2019-11-11 | End: 2019-11-11

## 2019-11-11 RX ORDER — LIDOCAINE HYDROCHLORIDE 20 MG/ML
3 INJECTION, SOLUTION INFILTRATION; PERINEURAL ONCE
Status: COMPLETED | OUTPATIENT
Start: 2019-11-11 | End: 2019-11-11

## 2019-11-11 RX ORDER — CYCLOBENZAPRINE HCL 5 MG
TABLET ORAL
Qty: 90 TABLET | Refills: 2 | Status: SHIPPED | OUTPATIENT
Start: 2019-11-11 | End: 2020-07-23 | Stop reason: SDUPTHER

## 2019-11-11 RX ORDER — HYDROXYCHLOROQUINE SULFATE 200 MG/1
200 TABLET, FILM COATED ORAL 2 TIMES DAILY
Qty: 60 TABLET | Refills: 5 | Status: SHIPPED | OUTPATIENT
Start: 2019-11-11 | End: 2020-06-22

## 2019-11-11 RX ORDER — TRIAMCINOLONE ACETONIDE 40 MG/ML
40 INJECTION, SUSPENSION INTRA-ARTICULAR; INTRAMUSCULAR ONCE
Status: DISCONTINUED | OUTPATIENT
Start: 2019-11-11 | End: 2020-08-04

## 2019-11-11 RX ADMIN — TRIAMCINOLONE ACETONIDE 40 MG: 40 INJECTION, SUSPENSION INTRA-ARTICULAR; INTRAMUSCULAR at 15:27

## 2019-11-11 RX ADMIN — LIDOCAINE HYDROCHLORIDE 3 ML: 20 INJECTION, SOLUTION INFILTRATION; PERINEURAL at 15:25

## 2020-04-06 ENCOUNTER — VIRTUAL VISIT (OUTPATIENT)
Dept: RHEUMATOLOGY | Age: 45
End: 2020-04-06
Payer: COMMERCIAL

## 2020-04-06 PROCEDURE — 99214 OFFICE O/P EST MOD 30 MIN: CPT | Performed by: INTERNAL MEDICINE

## 2020-04-06 NOTE — PROGRESS NOTES
activity     Days per week: Not on file     Minutes per session: Not on file    Stress: Not on file   Relationships    Social connections     Talks on phone: Not on file     Gets together: Not on file     Attends Zoroastrian service: Not on file     Active member of club or organization: Not on file     Attends meetings of clubs or organizations: Not on file     Relationship status: Not on file    Intimate partner violence     Fear of current or ex partner: Not on file     Emotionally abused: Not on file     Physically abused: Not on file     Forced sexual activity: Not on file   Other Topics Concern    Not on file   Social History Narrative    Not on file     No family history on file. PHYSICAL EXAMINATION:  [ INSTRUCTIONS:  \"[x]\" Indicates a positive item  \"[]\" Indicates a negative item  -- DELETE ALL ITEMS NOT EXAMINED]  Vital Signs: (As obtained by patient/caregiver or practitioner observation)    Blood pressure-  Heart rate-    Respiratory rate-    Temperature-  Pulse oximetry-     Constitutional: [x] Appears well-developed and well-nourished [x] No apparent distress      [] Abnormal-   Mental status  [x] Alert and awake  [x] Oriented to person/place/time [x]Able to follow commands      Eyes:  EOM    [x]  Normal  [] Abnormal-  Sclera  [x]  Normal  [] Abnormal -         Discharge [x]  None visible  [] Abnormal -    HENT:   [x] Normocephalic, atraumatic.   [] Abnormal   [x] Mouth/Throat: Mucous membranes are moist.     External Ears [x] Normal  [] Abnormal-     Neck: [x] No visualized mass     Pulmonary/Chest: [x] Respiratory effort normal.  [x] No visualized signs of difficulty breathing or respiratory distress        [] Abnormal-      Musculoskeletal:   [x] Normal gait with no signs of ataxia         [x] Normal range of motion of neck        [] Abnormal-       Neurological:        [x] No Facial Asymmetry (Cranial nerve 7 motor function) (limited exam to video visit)          [x] No gaze palsy        [] Abnormal-         Skin:        [x] No significant exanthematous lesions or discoloration noted on facial skin         [] Abnormal-            Psychiatric:       [x] Normal Affect [] No Hallucinations        [] Abnormal-             LABS AND IMAGING  Outside data reviewed and in HPI    Lab Results   Component Value Date    WBC 5.7 11/11/2019    RBC 3.96 11/11/2019    HGB 12.0 11/11/2019    HCT 36.1 11/11/2019     11/11/2019    MCV 91.2 11/11/2019    MCH 30.4 11/11/2019    MCHC 33.3 11/11/2019    RDW 12.9 11/11/2019    LYMPHOPCT 26.5 11/11/2019    MONOPCT 5.9 11/11/2019    BASOPCT 1.0 11/11/2019    MONOSABS 0.3 11/11/2019    LYMPHSABS 1.5 11/11/2019    EOSABS 0.2 11/11/2019    BASOSABS 0.1 11/11/2019       Chemistry        Component Value Date/Time     11/11/2019 1535    K 4.2 11/11/2019 1535     11/11/2019 1535    CO2 21 11/11/2019 1535    BUN 14 11/11/2019 1535    CREATININE 1.1 11/11/2019 1535        Component Value Date/Time    CALCIUM 9.9 11/11/2019 1535    ALKPHOS 90 11/11/2019 1535    AST 13 (L) 11/11/2019 1535    ALT 8 (L) 11/11/2019 1535    BILITOT <0.2 11/11/2019 1535          Lab Results   Component Value Date    SEDRATE 24 (H) 07/29/2019     Lab Results   Component Value Date    CRP 1.6 07/29/2019     Lab Results   Component Value Date    JAY Negative 07/29/2019     Lab Results   Component Value Date    RF 44.0 07/29/2019     Lab Results   Component Value Date    JAY Negative 07/29/2019     No results found for: DSDNAG, DSDNAIGGIFA  No results found for: SSAROAB, SSALAAB  No results found for: SMAB, RNPAB  No results found for: CENTABIGG  No results found for: C3, C4, ACE  Lab Results   Component Value Date    VITD25 9.9 07/29/2019     No results found for: Edwinna Curls  Lab Results   Component Value Date    MPGPYNBK91 1123 (H) 07/29/2019     Lab Results   Component Value Date    TSHFT4 0.60 07/29/2019     Lab Results   Component Value Date    VITD25 9.9 (L) 07/29/2019

## 2020-05-12 ENCOUNTER — TELEPHONE (OUTPATIENT)
Dept: INTERNAL MEDICINE CLINIC | Age: 45
End: 2020-05-12

## 2020-05-12 RX ORDER — PREDNISONE 1 MG/1
TABLET ORAL
Qty: 40 TABLET | Refills: 0 | Status: SHIPPED | OUTPATIENT
Start: 2020-05-12 | End: 2020-06-22

## 2020-06-19 ENCOUNTER — TELEPHONE (OUTPATIENT)
Dept: INTERNAL MEDICINE CLINIC | Age: 45
End: 2020-06-19

## 2020-06-19 NOTE — TELEPHONE ENCOUNTER
Patient states she was seen at [facility] 6/14/20 for chest pain and shortness of breath. They advised her they did see inflammation in her lungs. Patient had a virtual visit with her PCP, Dr Surendra Garnica today and she recommends patient see Dr Fazal Jolley due to possibly being related to RA. Patient has an appointment 7/6/20. Does Dr Fazal Jolley want to see her sooner?

## 2020-06-22 ENCOUNTER — OFFICE VISIT (OUTPATIENT)
Dept: RHEUMATOLOGY | Age: 45
End: 2020-06-22
Payer: COMMERCIAL

## 2020-06-22 VITALS — SYSTOLIC BLOOD PRESSURE: 127 MMHG | TEMPERATURE: 98.1 F | DIASTOLIC BLOOD PRESSURE: 82 MMHG | HEART RATE: 67 BPM

## 2020-06-22 DIAGNOSIS — M05.79 RHEUMATOID ARTHRITIS INVOLVING MULTIPLE SITES WITH POSITIVE RHEUMATOID FACTOR (HCC): ICD-10-CM

## 2020-06-22 PROCEDURE — 1036F TOBACCO NON-USER: CPT | Performed by: INTERNAL MEDICINE

## 2020-06-22 PROCEDURE — G8417 CALC BMI ABV UP PARAM F/U: HCPCS | Performed by: INTERNAL MEDICINE

## 2020-06-22 PROCEDURE — 99214 OFFICE O/P EST MOD 30 MIN: CPT | Performed by: INTERNAL MEDICINE

## 2020-06-22 PROCEDURE — G8428 CUR MEDS NOT DOCUMENT: HCPCS | Performed by: INTERNAL MEDICINE

## 2020-06-22 RX ORDER — NAPROXEN 500 MG/1
500 TABLET ORAL 2 TIMES DAILY WITH MEALS
Qty: 60 TABLET | Refills: 1 | Status: SHIPPED | OUTPATIENT
Start: 2020-06-22 | End: 2020-07-23

## 2020-06-22 RX ORDER — PREDNISONE 1 MG/1
5 TABLET ORAL DAILY
COMMUNITY
End: 2020-07-23

## 2020-06-22 RX ORDER — CHOLECALCIFEROL (VITAMIN D3) 1250 MCG
CAPSULE ORAL
COMMUNITY
End: 2021-03-05

## 2020-06-22 RX ORDER — PLECANATIDE 3 MG/1
TABLET ORAL
COMMUNITY
End: 2021-03-05

## 2020-06-22 RX ORDER — OMEPRAZOLE 40 MG/1
40 CAPSULE, DELAYED RELEASE ORAL DAILY
COMMUNITY

## 2020-06-22 NOTE — PROGRESS NOTES
2020     Patient Name: Venita Aburto  : 1975  Medical Record: [id number]    MEDICATIONS  Current Outpatient Medications   Medication Sig Dispense Refill    Cholecalciferol (VITAMIN D3) 1.25 MG (36393 UT) CAPS Take by mouth      Plecanatide (TRULANCE) 3 MG TABS Take by mouth      omeprazole (PRILOSEC) 20 MG delayed release capsule Take 20 mg by mouth daily      famotidine (PEPCID) 20 MG/2ML injection Infuse 20 mg intravenously 2 times daily      methotrexate (RHEUMATREX) 2.5 MG chemo tablet Take 4 tabs once a week for 2 weeks and increase to 6 tabs once a week. Take all tabs at the same time 24 tablet 1    predniSONE (DELTASONE) 5 MG tablet Take 5 mg by mouth daily      naproxen (NAPROSYN) 500 MG tablet Take 1 tablet by mouth 2 times daily (with meals) 60 tablet 1    cyclobenzaprine (FLEXERIL) 5 MG tablet Take 1 or 2 tabs at night. Can take 1 tab in am if tolerated 90 tablet 2    fenofibrate (TRICOR) 145 MG tablet Take 145 mg by mouth daily      folic acid (FOLVITE) 1 MG tablet Take 1 mg by mouth daily      levothyroxine (SYNTHROID) 50 MCG tablet Take 50 mcg by mouth Daily      Cholecalciferol (VITAMIN D) 2000 units TABS tablet Take 1 tablet by mouth daily After finishing 12 week couse 30 tablet 11     Current Facility-Administered Medications   Medication Dose Route Frequency Provider Last Rate Last Dose    triamcinolone acetonide (KENALOG-40) injection 40 mg  40 mg Intramuscular Once Cary Calvin MD           ALLERGIES  Allergies   Allergen Reactions    Iv Contrast [Iodides] Rash         Comments  No specialty comments available. Background history:  Venita Aburto is a 50 y.o. female with past medical history of hypothyroidism, dyslipidemia, anemia, pleurisy, pulmonary embolism who is being seen for follow up evaluation of  bilateral lower extremity pain and positive rheumatoid factor.   She has pain in both lower extremities and bilateral feet which started 2 years ago and is progressively getting worse. She describes her pain as 10 out of 10, aching/sore, on daily basis, without any significant relieving or aggravating factors. She also has on and off low back pain twice a week lasting for 20 minutes at a time. Pain intermittently radiates to right thigh. She has occasional tingling and numbness in legs. She denies any weakness. She denies any bowel or bladder dysfunction. She denies any change in the symptoms with exercise or rest.  She was placed on Cymbalta by primary care physician which caused jitteriness. She takes ibuprofen over-the-counter 600 mg as needed without any significant relief in pain. She gets occasional achiness in her hands with overactivity. She denies any swelling. She has a morning stiffness lasting for few minutes. She has intermittent swelling in her ankles. She also has a history of pulmonary embolism in 2015. She was on warfarin for 2 years. She denies family history of rheumatoid arthritis. She denies psoriasis, inflammatory bowel disease, inflammatory back pain, dactylitis, enthesitis, tenosynovitis and uveitis. She had bilateral lower extremity EMG which was normal.  She also had bone scan which showed mild degenerative changes. She had blood work which showed RF 44.8    Interim history: She presents for follow-up of rheumatoid arthritis and myofascial pain. She is off of Plaquenil for past few weeks. She did not notice any improvement on Plaquenil. She is on prednisone 5 mg daily. Joint pain, swelling and stiffness is improved on prednisone 5 mg daily. She is complaining of on and off chest pain located on the right side which radiates to the left side associated with exertional shortness of breath. Chest pain gets worse with deep breathing, laying down without any improvement with leaning forward. She denies cough, fever, weight loss or swollen glands. She went to the ER.   She had perfusion lung scan which was negative for mentioned in her chart and this remains unchanged fromprevious. Past Medical History:   Diagnosis Date    Anemia     Pleurisy     Pulmonary embolism ([address]     Thyroid disease      Past Surgical History:   Procedure Laterality Date    MOUTH SURGERY      TUBAL LIGATION       Social History     Socioeconomic History    Marital status: Single     Spouse name: Not on file    Number of children: Not on file    Years of education: Not on file    Highest education level: Not on file   Occupational History    Not on file   Social Needs    Financial resource strain: Not on file    Food insecurity     Worry: Not on file     Inability: Not on file    Transportation needs     Medical: Not on file     Non-medical: Not on file   Tobacco Use    Smoking status: Former Smoker     Types: Cigarettes     Last attempt to quit: 7/10/2019     Years since quittin.9    Smokeless tobacco: Never Used    Tobacco comment: max of 3 cigs per wk   Substance and Sexual Activity    Alcohol use: Yes     Comment: occ    Drug use: No    Sexual activity: Not on file   Lifestyle    Physical activity     Days per week: Not on file     Minutes per session: Not on file    Stress: Not on file   Relationships    Social connections     Talks on phone: Not on file     Gets together: Not on file     Attends Sikh service: Not on file     Active member of club or organization: Not on file     Attends meetings of clubs or organizations: Not on file     Relationship status: Not on file    Intimate partner violence     Fear of current or ex partner: Not on file     Emotionally abused: Not on file     Physically abused: Not on file     Forced sexual activity: Not on file   Other Topics Concern    Not on file   Social History Narrative    Not on file     No family history on file.        Physical Exam  Constitutional:  Well developed, well nourished, no acute distress, non-toxic appearance   Musculoskeletal:    RIGHT  Swell  Tender Value Date    VITD25 9.9 (L) 07/29/2019       Radiology:    Bone scan 5/13/19  IMPRESSION:    1.  Symmetric radiotracer activity in the rib cage as well as along the cervical, thoracic, and lumbar spine. No clear etiology for the patient's chest pain is demonstrated on today's bone scan. 2.  Normal three-phase evaluation of the bony pelvis and hip joints. There is a small amount of likely degenerative uptake in the left knee joint and in the right knee joint. 3.  Additional asymmetric likely degenerative uptake in the right acromioclavicular joint in comparison to the left acromioclavicular joint. There is also uptake suggestive of degenerative change in the right and the left hand and wrist.Workstation ID:[id number]      EMG 5/13/19     Normal study of the bilateral lower limbs and lumbar paraspinal   muscles. 1.  No acute lumbosacral radiculopathy from L2-S1 affecting the   bilateral lower limbs. 2.  No acute lumbar plexopathy affecting the bilateral lower   limbs. 3.  No peripheral neuropathy affecting the bilateral lower limbs. 4.  No peroneal entrapment or other focal entrapment neuropathy   affecting the bilateral lower limbs. 5.  No myopathy affecting the bilateral lower        Xray lumbar spine:          IMPRESSION:           Minimal degenerative changes of the lumbar spine with no acute findings       ASSESSMENT AND PLAN      Assessment/Plan:      ASSESSMENT:    1. Rheumatoid arthritis involving multiple sites with positive rheumatoid factor ([address]    2. Myofascial pain    3. SOB (shortness of breath)    4. High risk medication use    5. Chest pain, unspecified type        PLAN:   1. Rheumatoid arthritis involving multiple sites with positive rheumatoid factor (HCC)  RF, CCP positive.   Elevated ESR and CRP  Off of Plaquenil due to frequent flareup  Now on prednisone 5 mg daily with improvement in symptoms  Will start methotrexate 6 tablets once a week and continue folic acid 1 mg daily  Continue prednisone 5 mg daily  - CBC Auto Differential  - Comprehensive Metabolic Panel  - C-Reactive Protein; Future  - Sedimentation Rate; Future  - methotrexate (RHEUMATREX) 2.5 MG chemo tablet; Take 4 tabs once a week for 2 weeks and increase to 6 tabs once a week. Take all tabs at the same time  Dispense: 24 tablet; Refill: 1    2. Myofascial pain  Chest pain most likely secondary to myofascial pain/costochondritis. Extensive cardiac work-up unremarkable. Lung perfusion scan no evidence of pulmonary embolism. Echocardiogram and cardiac enzymes normal  Recommend continue Flexeril as needed  Naproxen 500 mg twice a day as needed    3. SOB (shortness of breath)  Recent lung perfusion scan normal.  History of blood clots. She also had CT chest in February 2020 without any evidence of pulmonary embolism  Will obtain PFTs to evaluate further  - Full PFT Study With Bronchodilator; Future  - 6 Minute Walk Test; Future    4. High risk medication use  Methotrexate increases the risk of infections, birth defects, liver toxicity, rare lung problems, probable malignancy and bone marrow toxicity and need to check the blood counts every month for first 3 months and then every 2 months and it was discussed in detail with the patient. Alcohol abstinence is advised while taking methotrexate. The patient indicates understanding of these issues and agrees with the plan. Return in about 4 weeks (around 7/20/2020). The risks and benefits of my recommendations, as well as other treatment options, benefits and side effects werediscussed with the patient. All questions were answered. ######################################################################    I thank you for giving me theopportunity to participate in [facility] care. If you have any questions or concerns please feel free to contact me. I look forward to following  Ame along with you.       Electronically signed by: Rayray Olvera MD, 6/22/2020 4:38

## 2020-06-23 LAB
A/G RATIO: 1.4 (ref 1.1–2.2)
ALBUMIN SERPL-MCNC: 4.3 G/DL (ref 3.4–5)
ALP BLD-CCNC: 68 U/L (ref 40–129)
ALT SERPL-CCNC: 12 U/L (ref 10–40)
ANION GAP SERPL CALCULATED.3IONS-SCNC: 15 MMOL/L (ref 3–16)
AST SERPL-CCNC: 18 U/L (ref 15–37)
BASOPHILS ABSOLUTE: 0.1 K/UL (ref 0–0.2)
BASOPHILS RELATIVE PERCENT: 0.7 %
BILIRUB SERPL-MCNC: <0.2 MG/DL (ref 0–1)
BUN BLDV-MCNC: 16 MG/DL (ref 7–20)
C-REACTIVE PROTEIN: 2.1 MG/L (ref 0–5.1)
CALCIUM SERPL-MCNC: 9.9 MG/DL (ref 8.3–10.6)
CHLORIDE BLD-SCNC: 99 MMOL/L (ref 99–110)
CO2: 23 MMOL/L (ref 21–32)
CREAT SERPL-MCNC: 1 MG/DL (ref 0.6–1.1)
EOSINOPHILS ABSOLUTE: 0.1 K/UL (ref 0–0.6)
EOSINOPHILS RELATIVE PERCENT: 1.1 %
GFR AFRICAN AMERICAN: >60
GFR NON-AFRICAN AMERICAN: 59
GLOBULIN: 3 G/DL
GLUCOSE BLD-MCNC: 104 MG/DL (ref 70–99)
HCT VFR BLD CALC: 37.2 % (ref 36–48)
HEMOGLOBIN: 12.5 G/DL (ref 12–16)
LYMPHOCYTES ABSOLUTE: 1.6 K/UL (ref 1–5.1)
LYMPHOCYTES RELATIVE PERCENT: 17.6 %
MCH RBC QN AUTO: 31 PG (ref 26–34)
MCHC RBC AUTO-ENTMCNC: 33.5 G/DL (ref 31–36)
MCV RBC AUTO: 92.6 FL (ref 80–100)
MONOCYTES ABSOLUTE: 0.3 K/UL (ref 0–1.3)
MONOCYTES RELATIVE PERCENT: 3 %
NEUTROPHILS ABSOLUTE: 6.8 K/UL (ref 1.7–7.7)
NEUTROPHILS RELATIVE PERCENT: 77.6 %
PDW BLD-RTO: 13.5 % (ref 12.4–15.4)
PLATELET # BLD: 356 K/UL (ref 135–450)
PMV BLD AUTO: 7.8 FL (ref 5–10.5)
POTASSIUM SERPL-SCNC: 4.2 MMOL/L (ref 3.5–5.1)
RBC # BLD: 4.02 M/UL (ref 4–5.2)
SEDIMENTATION RATE, ERYTHROCYTE: 27 MM/HR (ref 0–20)
SODIUM BLD-SCNC: 137 MMOL/L (ref 136–145)
TOTAL PROTEIN: 7.3 G/DL (ref 6.4–8.2)
WBC # BLD: 8.8 K/UL (ref 4–11)

## 2020-07-02 ENCOUNTER — TELEPHONE (OUTPATIENT)
Dept: RHEUMATOLOGY | Age: 45
End: 2020-07-02

## 2020-07-02 NOTE — TELEPHONE ENCOUNTER
Please call the patient and let her know that her pulmonary function test shows mild restrictive pattern.  I will refer to pulmonology for evaluation    Informed pt.  Mailed referral to her home

## 2020-07-08 ENCOUNTER — TELEPHONE (OUTPATIENT)
Dept: INTERNAL MEDICINE CLINIC | Age: 45
End: 2020-07-08

## 2020-07-08 NOTE — TELEPHONE ENCOUNTER
Patient requesting results of PFT faxed to pulmonologist, Dr. Alfredo Hutchins fax no. [phone].

## 2020-07-08 NOTE — TELEPHONE ENCOUNTER
Patient called back. She is asking for the PFT to be faxed right away. Dr Alfredo Hutchins has a cancellation for today and they can get her in once they get the PFT.

## 2020-07-23 ENCOUNTER — VIRTUAL VISIT (OUTPATIENT)
Dept: RHEUMATOLOGY | Age: 45
End: 2020-07-23
Payer: COMMERCIAL

## 2020-07-23 PROCEDURE — G8427 DOCREV CUR MEDS BY ELIG CLIN: HCPCS | Performed by: INTERNAL MEDICINE

## 2020-07-23 PROCEDURE — 99214 OFFICE O/P EST MOD 30 MIN: CPT | Performed by: INTERNAL MEDICINE

## 2020-07-23 RX ORDER — CYCLOBENZAPRINE HCL 5 MG
TABLET ORAL
Qty: 90 TABLET | Refills: 5 | Status: SHIPPED | OUTPATIENT
Start: 2020-07-23 | End: 2021-03-05

## 2020-07-23 RX ORDER — CELECOXIB 100 MG/1
100 CAPSULE ORAL 2 TIMES DAILY
Qty: 60 CAPSULE | Refills: 3 | Status: SHIPPED | OUTPATIENT
Start: 2020-07-23 | End: 2020-12-04

## 2020-07-23 NOTE — PROGRESS NOTES
2020   Devora Atkinson is a 50 y.o. female being evaluated by a Virtual Visit (video visit) encounter to address concerns as mentioned above. A caregiver was present when appropriate. Due to this being a TeleHealth encounter (During [facility]- public health emergency), evaluation of the following organ systems was limited: Vitals/Constitutional/EENT/Resp/CV/GI//MS/Neuro/Skin/Heme-Lymph-Imm. Pursuant to the emergency declaration under the [address] [place] [address], [address] [place] [address] and the [employer] Act, this Virtual Visit was conducted with patient's (and/or legal guardian's) consent, to reduce the patient's risk of exposure to COVID-19 and provide necessary medical care. The patient (and/or legal guardian) has also been advised to contact this office for worsening conditions or problems, and seek emergency medical treatment and/or call 911 if deemed necessary. Patient identification was verified at the start of the visit: Yes    Total time spent for this encounter: Not billed by time    Services were provided through a video synchronous discussion virtually to substitute for in-person clinic visit. Patient and provider were located at their individual homes. --Stew Jimenez MD on 2020 at 3:41 PM    An electronic signature was used to authenticate this note. Patient Name: Devora Atkinson  : 1975  Medical Record: [id number]    MEDICATIONS  Current Outpatient Medications   Medication Sig Dispense Refill    cyclobenzaprine (FLEXERIL) 5 MG tablet Take 1 or 2 tabs at night.  Can take 1 tab in am if tolerated 90 tablet 5    celecoxib (CELEBREX) 100 MG capsule Take 1 capsule by mouth 2 times daily 60 capsule 3    Cholecalciferol (VITAMIN D3) 1.25 MG (66855 UT) CAPS Take by mouth      Plecanatide (TRULANCE) 3 MG TABS Take by mouth      omeprazole (PRILOSEC) 20 MG delayed release capsule Take 20 mg by mouth daily  famotidine (PEPCID) 20 MG/2ML injection Infuse 20 mg intravenously 2 times daily      methotrexate (RHEUMATREX) 2.5 MG chemo tablet Take 4 tabs once a week for 2 weeks and increase to 6 tabs once a week. Take all tabs at the same time 24 tablet 1    fenofibrate (TRICOR) 145 MG tablet Take 145 mg by mouth daily      folic acid (FOLVITE) 1 MG tablet Take 1 mg by mouth daily      levothyroxine (SYNTHROID) 50 MCG tablet Take 50 mcg by mouth Daily      Cholecalciferol (VITAMIN D) 2000 units TABS tablet Take 1 tablet by mouth daily After finishing 12 week couse 30 tablet 11     Current Facility-Administered Medications   Medication Dose Route Frequency Provider Last Rate Last Dose    triamcinolone acetonide (KENALOG-40) injection 40 mg  40 mg Intramuscular Once Kathia Garrido MD           ALLERGIES  Allergies   Allergen Reactions    Iv Contrast [Iodides] Rash         Comments  No specialty comments available. Background history:  Mihai Amaya is a 50 y.o. female with past medical history of hypothyroidism, dyslipidemia, anemia, pleurisy, pulmonary embolism who is being seen for follow up evaluation of  bilateral lower extremity pain and positive rheumatoid factor. She has pain in both lower extremities and bilateral feet which started 2 years ago and is progressively getting worse. She describes her pain as 10 out of 10, aching/sore, on daily basis, without any significant relieving or aggravating factors. She also has on and off low back pain twice a week lasting for 20 minutes at a time. Pain intermittently radiates to right thigh. She has occasional tingling and numbness in legs. She denies any weakness. She denies any bowel or bladder dysfunction. She denies any change in the symptoms with exercise or rest.  She was placed on Cymbalta by primary care physician which caused jitteriness. She takes ibuprofen over-the-counter 600 mg as needed without any significant relief in pain.   She gets occasional achiness in her hands with overactivity. She denies any swelling. She has a morning stiffness lasting for few minutes. She has intermittent swelling in her ankles. She also has a history of pulmonary embolism in 2015. She was on warfarin for 2 years. She denies family history of rheumatoid arthritis. She denies psoriasis, inflammatory bowel disease, inflammatory back pain, dactylitis, enthesitis, tenosynovitis and uveitis. She had bilateral lower extremity EMG which was normal.  She also had bone scan which showed mild degenerative changes. She had blood work which showed RF 44.8    Interim history: She presents for follow-up of rheumatoid arthritis and myofascial pain. She is on methotrexate 6 tablets once a week. She has noticed improvement in joint pain, swelling and stiffness except in her legs and hips. Symptoms are worse in the left hip. She has morning stiffness lasting for 5 to 10 minutes. She continues to complain of shortness of breath. She had perfusion lung scan which was negative for pulmonary embolism. PFT was normal.  She also had cardiac work-up which came back normal.  She was evaluated by a pulmonologist.  She is scheduled to see another pulmonologist for second opinion. She also has myofascial pain involving bilateral lower extremities. She is off of Flexeril for past 3 weeks and her pain has worsened. She had EMG of bilateral lower extremities which was normal.  She also had a bone scan which showed mild degenerative changes. Hand and foot x-ray show mild degenerative changes. Her blood work showed positive RF, CCP and low vitamin D. HPI  Review of Systems    REVIEW OF SYSTEMS: History of pulmonary embolism, pleurisy  Constitutional: No unanticipated weight loss or fevers. No fatigue and malaise.   Integumentary: No rash, photosensitivity, malar rash, livedo reticularis, alopecia and Raynaud's symptoms, sclerodactyly, skin tightening  Eyes: negative for visual disturbance and persistent redness, discharge from eyes   ENT: - No tinnitus, loss of hearing, vertigo, or recurrent ear infections.  - No history of nasal/oral ulcers. - No history of dry eyes/dry mouth  Cardiovascular: No history of pericarditis, chest pain or murmur or palpitations  Respiratory: No shortness of breath, cough or history of interstitial lung disease. No history of tuberculosis or atypical infections. Gastrointestinal: No history of heart burn, dysphagia or esophageal dysmotility. No change in bowel habits or any inflammatory bowel disease. Genitourinary: No history renal disease, miscarriages. Hematologic/Lymphatic: No abnormal bruising or bleeding, swollen lymph nodes. Neurological: No history of headaches, seizure or focal weakness. No history of neuropathies, paresthesias or hyperesthesias, facial droop, diplopia  Psychiatric: No history of bipolar disease, anxiety, depression  Endocrine: Denies any polyuria, polydipsia and osteoporosis  Allergic/Immunologic: No nasal congestion or hives. I have reviewed patients Past medical History, Social History and Family History as mentioned in her chart and this remains unchanged fromprevious.     Past Medical History:   Diagnosis Date    Anemia     Pleurisy     Pulmonary embolism ([facility] [address]     Thyroid disease      Past Surgical History:   Procedure Laterality Date    MOUTH SURGERY      TUBAL LIGATION       Social History     Socioeconomic History    Marital status: Single     Spouse name: Not on file    Number of children: Not on file    Years of education: Not on file    Highest education level: Not on file   Occupational History    Not on file   Social Needs    Financial resource strain: Not on file    Food insecurity     Worry: Not on file     Inability: Not on file    Transportation needs     Medical: Not on file     Non-medical: Not on file   Tobacco Use    Smoking status: Former Smoker     Types: Cigarettes     Last attempt to quit: 7/10/2019     Years since quittin.0    Smokeless tobacco: Never Used    Tobacco comment: max of 3 cigs per wk   Substance and Sexual Activity    Alcohol use: Yes     Comment: occ    Drug use: No    Sexual activity: Not on file   Lifestyle    Physical activity     Days per week: Not on file     Minutes per session: Not on file    Stress: Not on file   Relationships    Social connections     Talks on phone: Not on file     Gets together: Not on file     Attends Nondenominational service: Not on file     Active member of club or organization: Not on file     Attends meetings of clubs or organizations: Not on file     Relationship status: Not on file    Intimate partner violence     Fear of current or ex partner: Not on file     Emotionally abused: Not on file     Physically abused: Not on file     Forced sexual activity: Not on file   Other Topics Concern    Not on file   Social History Narrative    Not on file     No family history on file. PHYSICAL EXAMINATION:  [ INSTRUCTIONS:  \"[x]\" Indicates a positive item  \"[]\" Indicates a negative item  -- DELETE ALL ITEMS NOT EXAMINED]  Vital Signs: (As obtained by patient/caregiver or practitioner observation)    Blood pressure-  Heart rate-    Respiratory rate-    Temperature-  Pulse oximetry-     Constitutional: [x] Appears well-developed and well-nourished [x] No apparent distress      [] Abnormal-   Mental status  [x] Alert and awake  [x] Oriented to person/place/time [x]Able to follow commands      Eyes:  EOM    [x]  Normal  [] Abnormal-  Sclera  [x]  Normal  [] Abnormal -         Discharge [x]  None visible  [] Abnormal -    HENT:   [x] Normocephalic, atraumatic.   [] Abnormal   [x] Mouth/Throat: Mucous membranes are moist.     External Ears [x] Normal  [] Abnormal-     Neck: [x] No visualized mass     Pulmonary/Chest: [x] Respiratory effort normal.  [x] No visualized signs of difficulty breathing or respiratory distress        [] Abnormal- 07/29/2019     No results found for: Jassi Jaeger Results   Component Value Date    HSRURVMT00 9628 (H) 07/29/2019     Lab Results   Component Value Date    TSHFT4 0.60 07/29/2019     Lab Results   Component Value Date    VITD25 9.9 (L) 07/29/2019       Radiology:    Bone scan 5/13/19  IMPRESSION:    1.  Symmetric radiotracer activity in the rib cage as well as along the cervical, thoracic, and lumbar spine. No clear etiology for the patient's chest pain is demonstrated on today's bone scan. 2.  Normal three-phase evaluation of the bony pelvis and hip joints. There is a small amount of likely degenerative uptake in the left knee joint and in the right knee joint. 3.  Additional asymmetric likely degenerative uptake in the right acromioclavicular joint in comparison to the left acromioclavicular joint. There is also uptake suggestive of degenerative change in the right and the left hand and wrist.Workstation ID:[id number]      EMG 5/13/19     Normal study of the bilateral lower limbs and lumbar paraspinal   muscles. 1.  No acute lumbosacral radiculopathy from L2-S1 affecting the   bilateral lower limbs. 2.  No acute lumbar plexopathy affecting the bilateral lower   limbs. 3.  No peripheral neuropathy affecting the bilateral lower limbs. 4.  No peroneal entrapment or other focal entrapment neuropathy   affecting the bilateral lower limbs. 5.  No myopathy affecting the bilateral lower        Xray lumbar spine:          IMPRESSION:           Minimal degenerative changes of the lumbar spine with no acute findings       ASSESSMENT AND PLAN      Assessment/Plan:      ASSESSMENT:    1. Rheumatoid arthritis involving multiple sites with positive rheumatoid factor ([address]    2. Myofascial pain    3. SOB (shortness of breath)    4. High risk medication use        PLAN:   1. Rheumatoid arthritis involving multiple sites with positive rheumatoid factor (HCC)  RF, CCP positive.   Elevated ESR and CRP  Off of Plaquenil and prednisone. Did not notice any improvement on Plaquenil  Symptoms improved on methotrexate. Will continue methotrexate 6 tablets once a week  Will obtain labs    2. Myofascial pain  Chest pain most likely secondary to myofascial pain/costochondritis. Extensive cardiac work-up unremarkable. Lung perfusion scan no evidence of pulmonary embolism. Echocardiogram and cardiac enzymes normal  Recommend continue Flexeril as needed  Did not tolerate naproxen due to epigastric pain. Will start Celebrex 100 mg twice a day  Will refer to aquatic therapy  -Strongly emphasized on low impact aerobic and stretching exercises including biking, swimming, walking, yoga or tiachi classes  -deep breathing and relaxation exercises   -mindfulness techniques  -Counseled on Sleep hygiene   -Advised to drink 64 oz of water   -Limit caffeine intake to 8 oz/day     3. SOB (shortness of breath)  Recent lung perfusion scan normal.  History of blood clots. She also had CT chest in February 2020 without any evidence of pulmonary embolism. PFT was normal.  She was seen by pulmonologist.  She is scheduled to see another pulmonologist for second opinion. 4. High risk medication use  Recommend yearly flu vaccine and pneumonia vaccine. Labs every 4 weeks. The patient indicates understanding of these issues and agrees with the plan. Return in about 8 weeks (around 9/17/2020). The risks and benefits of my recommendations, as well as other treatment options, benefits and side effects werediscussed with the patient. All questions were answered. ######################################################################    I thank you for giving me theopportunity to participate in [facility] care. If you have any questions or concerns please feel free to contact me. I look forward to following  Ame along with you.       Electronically signed by: Anderson Serrano MD, 7/23/2020 3:24 PM    Documentation was done using voice recognition dragon software. Every effort was made to ensure accuracy;however, inadvertent unintentional computerized transcription errors may be present.

## 2020-07-24 ENCOUNTER — TELEPHONE (OUTPATIENT)
Dept: INTERNAL MEDICINE CLINIC | Age: 45
End: 2020-07-24

## 2020-08-04 ENCOUNTER — OFFICE VISIT (OUTPATIENT)
Dept: PULMONOLOGY | Age: 45
End: 2020-08-04
Payer: COMMERCIAL

## 2020-08-04 VITALS
OXYGEN SATURATION: 98 % | SYSTOLIC BLOOD PRESSURE: 120 MMHG | HEART RATE: 64 BPM | RESPIRATION RATE: 14 BRPM | HEIGHT: 65 IN | WEIGHT: 199 LBS | DIASTOLIC BLOOD PRESSURE: 84 MMHG | TEMPERATURE: 98.5 F | BODY MASS INDEX: 33.15 KG/M2

## 2020-08-04 DIAGNOSIS — Z79.899 HIGH RISK MEDICATION USE: ICD-10-CM

## 2020-08-04 PROBLEM — R06.02 SHORTNESS OF BREATH: Status: ACTIVE | Noted: 2020-08-04

## 2020-08-04 PROBLEM — M06.9 RHEUMATOID ARTHRITIS (HCC): Status: ACTIVE | Noted: 2020-08-04

## 2020-08-04 LAB
ALT SERPL-CCNC: 9 U/L (ref 10–40)
AST SERPL-CCNC: 14 U/L (ref 15–37)
BASOPHILS ABSOLUTE: 0.1 K/UL (ref 0–0.2)
BASOPHILS RELATIVE PERCENT: 0.8 %
CREAT SERPL-MCNC: 1 MG/DL (ref 0.6–1.1)
EOSINOPHILS ABSOLUTE: 0.4 K/UL (ref 0–0.6)
EOSINOPHILS RELATIVE PERCENT: 6 %
GFR AFRICAN AMERICAN: >60
GFR NON-AFRICAN AMERICAN: 59
HCT VFR BLD CALC: 35.2 % (ref 36–48)
HEMOGLOBIN: 11.9 G/DL (ref 12–16)
LYMPHOCYTES ABSOLUTE: 2.4 K/UL (ref 1–5.1)
LYMPHOCYTES RELATIVE PERCENT: 34.5 %
MCH RBC QN AUTO: 30.7 PG (ref 26–34)
MCHC RBC AUTO-ENTMCNC: 33.9 G/DL (ref 31–36)
MCV RBC AUTO: 90.8 FL (ref 80–100)
MONOCYTES ABSOLUTE: 0.6 K/UL (ref 0–1.3)
MONOCYTES RELATIVE PERCENT: 9.1 %
NEUTROPHILS ABSOLUTE: 3.5 K/UL (ref 1.7–7.7)
NEUTROPHILS RELATIVE PERCENT: 49.6 %
PDW BLD-RTO: 13.6 % (ref 12.4–15.4)
PLATELET # BLD: 363 K/UL (ref 135–450)
PMV BLD AUTO: 9.1 FL (ref 5–10.5)
RBC # BLD: 3.87 M/UL (ref 4–5.2)
WBC # BLD: 7.1 K/UL (ref 4–11)

## 2020-08-04 PROCEDURE — 99244 OFF/OP CNSLTJ NEW/EST MOD 40: CPT | Performed by: INTERNAL MEDICINE

## 2020-08-04 PROCEDURE — G8427 DOCREV CUR MEDS BY ELIG CLIN: HCPCS | Performed by: INTERNAL MEDICINE

## 2020-08-04 PROCEDURE — G8417 CALC BMI ABV UP PARAM F/U: HCPCS | Performed by: INTERNAL MEDICINE

## 2020-08-04 NOTE — ASSESSMENT & PLAN NOTE
-Unclear etiology though possibly due to deconditioning/being overweight. Her pulmonary function tests are largely within normal limits. There is a restrictive component but her diffusing capacity is within normal limits and CT does not show any evidence of interstitial lung disease.  -Having said that does not rule out possible cardiac etiologies especially with strong family history of coronary artery disease and personal history of RA. -Agree with at least annual spirometry to monitor FVC in setting of rheumatoid arthritis and methotrexate.  -Patient states that she plans to see her pulmonologist in [place] as it is much closer for her, I agree this makes a lot of sense for her we will see her back in clinic as needed.

## 2020-08-04 NOTE — PROGRESS NOTES
REASON FOR CONSULTATION:  Chief Complaint   Patient presents with    Other     NP ref by Dr Jennifer Carias for RLD        Consult at request of Dulce Sam MD     PCP: Dulce Sam MD    HISTORY OF PRESENT ILLNESS: Vidal Walker is a 50y.o. year old female with a history of rheumatoid arthritis, recently started on methotrexate who presents for second opinion on shortness of breath. Patient follows with Dr. Jennifer Carias rheumatology. She was previously on Plaquenil but did not tolerate and switched to methotrexate. She was evaluated by pulmonologist at her hometown in [place], but already had this appointment so wanted to go ahead and get a second opinion. Shortness of breath is been present for greater than 1 year. She has not found anything that makes it better or worse. She pulmonary function test performed that showed normal spirometry without airflow obstruction. Normal diffusing capacity. At a reduced total lung capacity thought to be secondary to obesity. She had a CT of the chest performed that did not show any evidence of interstitial lung disease, rheumatoid nodules. But there was some very mild scattered emphysematous changes. She is a former smoker, but just quit 2 months ago. She has been started on methotrexate and has only taken 1 dose. Past Medical History:   Diagnosis Date    Anemia     Pleurisy     Pulmonary embolism ([address]     Thyroid disease        Past Surgical History:   Procedure Laterality Date    MOUTH SURGERY      TUBAL LIGATION         family history is not on file. Father with RA, and CAD  Uncle with CAD    SOCIAL HISTORY:   reports that she quit smoking about 2 months ago. Her smoking use included cigarettes. She has never used smokeless tobacco.      ALLERGIES:  Patient is allergic to iv contrast [iodides].     REVIEW OF SYSTEMS:  Constitutional: Negative for fever   HENT: Negative for sore throat  Eyes: Negative for redness   Respiratory: +dyspnea, -cough  Cardiovascular: Negative for chest pain  Gastrointestinal: Negative for vomiting, diarrhea   Genitourinary: Negative for hematuria   Musculoskeletal: Negative for arthralgias   Skin: Negative for rash  Neurological: Negative for syncope  Hematological: Negative for adenopathy  Psychiatric/Behavorial: Negative for anxiety    Objective:   PHYSICAL EXAM:  Blood pressure 120/84, pulse 64, temperature 98.5 °F (36.9 °C), temperature source Oral, resp. rate 14, height 5' 5\" (1.651 m), weight 199 lb (90.3 kg), last menstrual period 08/15/2016, SpO2 98 %, not currently breastfeeding.'  Gen: No distress. Eyes: PERRL. No sclera icterus. No conjunctival injection. ENT: No discharge. Pharynx clear. External appearance of ears and nose normal.  Neck: Trachea midline. No obvious mass. Resp: No accessory muscle use. No crackles. No wheezes. No rhonchi. CV: Regular rate. Regular rhythm. No murmur or rub. No edema. GI: Non-tender. Non-distended. No hernia. Skin: Warm, dry, normal texture and turgor. No nodule on exposed extremities. Lymph: No cervical LAD. No supraclavicular LAD. M/S: No cyanosis. No clubbing. No joint deformity. Neuro: Moves all four extremities. Psych: Oriented x 3. No anxiety. Awake. Alert. Intact judgement and insight. Current Outpatient Medications   Medication Sig Dispense Refill    cyclobenzaprine (FLEXERIL) 5 MG tablet Take 1 or 2 tabs at night.  Can take 1 tab in am if tolerated 90 tablet 5    celecoxib (CELEBREX) 100 MG capsule Take 1 capsule by mouth 2 times daily 60 capsule 3    Cholecalciferol (VITAMIN D3) 1.25 MG (21203 UT) CAPS Take by mouth      Plecanatide (TRULANCE) 3 MG TABS Take by mouth      omeprazole (PRILOSEC) 20 MG delayed release capsule Take 20 mg by mouth daily      famotidine (PEPCID) 20 MG/2ML injection Infuse 20 mg intravenously 2 times daily      methotrexate (RHEUMATREX) 2.5 MG chemo tablet Take 4 tabs once a week for 2 weeks and increase to 6

## 2020-08-18 RX ORDER — CHOLECALCIFEROL (VITAMIN D3) 50 MCG
TABLET ORAL
Qty: 30 TABLET | Refills: 10 | Status: SHIPPED | OUTPATIENT
Start: 2020-08-18 | End: 2021-10-12

## 2021-01-25 DIAGNOSIS — Z79.899 HIGH RISK MEDICATION USE: ICD-10-CM

## 2021-01-25 LAB
ALT SERPL-CCNC: 13 U/L (ref 10–40)
AST SERPL-CCNC: 17 U/L (ref 15–37)
BASOPHILS ABSOLUTE: 0.1 K/UL (ref 0–0.2)
BASOPHILS RELATIVE PERCENT: 1.1 %
CREAT SERPL-MCNC: 1 MG/DL (ref 0.6–1.1)
EOSINOPHILS ABSOLUTE: 0.3 K/UL (ref 0–0.6)
EOSINOPHILS RELATIVE PERCENT: 4.5 %
GFR AFRICAN AMERICAN: >60
GFR NON-AFRICAN AMERICAN: 59
HCT VFR BLD CALC: 36.8 % (ref 36–48)
HEMOGLOBIN: 12.3 G/DL (ref 12–16)
LYMPHOCYTES ABSOLUTE: 2.3 K/UL (ref 1–5.1)
LYMPHOCYTES RELATIVE PERCENT: 41.4 %
MCH RBC QN AUTO: 30.3 PG (ref 26–34)
MCHC RBC AUTO-ENTMCNC: 33.3 G/DL (ref 31–36)
MCV RBC AUTO: 91 FL (ref 80–100)
MONOCYTES ABSOLUTE: 0.5 K/UL (ref 0–1.3)
MONOCYTES RELATIVE PERCENT: 8.5 %
NEUTROPHILS ABSOLUTE: 2.5 K/UL (ref 1.7–7.7)
NEUTROPHILS RELATIVE PERCENT: 44.5 %
PDW BLD-RTO: 13.4 % (ref 12.4–15.4)
PLATELET # BLD: 385 K/UL (ref 135–450)
PMV BLD AUTO: 8.2 FL (ref 5–10.5)
RBC # BLD: 4.04 M/UL (ref 4–5.2)
WBC # BLD: 5.6 K/UL (ref 4–11)

## 2021-03-05 ENCOUNTER — VIRTUAL VISIT (OUTPATIENT)
Dept: RHEUMATOLOGY | Age: 46
End: 2021-03-05
Payer: COMMERCIAL

## 2021-03-05 DIAGNOSIS — R06.02 SOB (SHORTNESS OF BREATH): ICD-10-CM

## 2021-03-05 DIAGNOSIS — M79.18 MYOFASCIAL PAIN: ICD-10-CM

## 2021-03-05 DIAGNOSIS — M05.79 RHEUMATOID ARTHRITIS INVOLVING MULTIPLE SITES WITH POSITIVE RHEUMATOID FACTOR (HCC): Primary | ICD-10-CM

## 2021-03-05 DIAGNOSIS — Z79.899 HIGH RISK MEDICATION USE: ICD-10-CM

## 2021-03-05 PROCEDURE — G8427 DOCREV CUR MEDS BY ELIG CLIN: HCPCS | Performed by: INTERNAL MEDICINE

## 2021-03-05 PROCEDURE — 99214 OFFICE O/P EST MOD 30 MIN: CPT | Performed by: INTERNAL MEDICINE

## 2021-03-05 RX ORDER — FAMOTIDINE 40 MG/1
40 TABLET, FILM COATED ORAL NIGHTLY PRN
COMMUNITY
Start: 2021-02-21

## 2021-03-05 RX ORDER — CHLORAL HYDRATE 500 MG
CAPSULE ORAL 2 TIMES DAILY
COMMUNITY
Start: 2021-01-18

## 2021-03-05 RX ORDER — LANOLIN ALCOHOL/MO/W.PET/CERES
1000 CREAM (GRAM) TOPICAL DAILY
COMMUNITY

## 2021-03-05 RX ORDER — CELECOXIB 100 MG/1
100 CAPSULE ORAL DAILY
COMMUNITY

## 2021-03-05 NOTE — PROGRESS NOTES
capsule Take 100 mg by mouth daily      vitamin D (CHOLECALCIFEROL) 50 MCG (2000 UT) TABS tablet TAKE ONE TABLET BY MOUTH DAILY AFTER FINISHING 12 WEEK COURSE 30 tablet 10    omeprazole (PRILOSEC) 40 MG delayed release capsule Take 40 mg by mouth daily       fenofibrate (TRICOR) 145 MG tablet Take 145 mg by mouth daily      folic acid (FOLVITE) 1 MG tablet Take 1 mg by mouth daily      levothyroxine (SYNTHROID) 50 MCG tablet Take 50 mcg by mouth Daily       No current facility-administered medications for this visit. ALLERGIES  Allergies   Allergen Reactions    Iv Contrast [Iodides] Rash    Other Rash     Vascepa          Comments  No specialty comments available. Background history:  Nimo Perez is a 50 y.o. female with past medical history of hypothyroidism, dyslipidemia, anemia, pleurisy, pulmonary embolism who is being seen for follow up evaluation of  bilateral lower extremity pain and positive rheumatoid factor. She has pain in both lower extremities and bilateral feet which started 2 years ago and is progressively getting worse. She describes her pain as 10 out of 10, aching/sore, on daily basis, without any significant relieving or aggravating factors. She also has on and off low back pain twice a week lasting for 20 minutes at a time. Pain intermittently radiates to right thigh. She has occasional tingling and numbness in legs. She denies any weakness. She denies any bowel or bladder dysfunction. She denies any change in the symptoms with exercise or rest.  She was placed on Cymbalta by primary care physician which caused jitteriness. She takes ibuprofen over-the-counter 600 mg as needed without any significant relief in pain. She gets occasional achiness in her hands with overactivity. She denies any swelling. She has a morning stiffness lasting for few minutes. She has intermittent swelling in her ankles. She also has a history of pulmonary embolism in 2015.   She was on warfarin for 2 years. She denies family history of rheumatoid arthritis. She denies psoriasis, inflammatory bowel disease, inflammatory back pain, dactylitis, enthesitis, tenosynovitis and uveitis. She had bilateral lower extremity EMG which was normal.  She also had bone scan which showed mild degenerative changes. She had blood work which showed RF 44.8    Interim history: She presents for follow-up of rheumatoid arthritis and myofascial pain. She is on methotrexate 8 tablets once a week. She has occasional achiness in the hands and stiffness in the morning lasting for 15 minutes. She denies any swelling in the joints. Shortness of breath is improved. She recently saw her pulmonologist who recommended once a year PFT for monitoring. She had perfusion lung scan which was negative for pulmonary embolism. PFT was normal.  She also had cardiac work-up which came back normal.    She also has myofascial pain involving bilateral lower extremities. she has stopped taking Flexeril. She takes Celebrex 100 mg daily with some improvement. She had EMG of bilateral lower extremities which was normal.  She also had a bone scan which showed mild degenerative changes. Hand and foot x-ray show mild degenerative changes. Her blood work showed positive RF, CCP and low vitamin D. She denies any side effects with methotrexate. She denies any recent fevers or infections. She is able to maintain her ADLs without limitations. HPI  Review of Systems    REVIEW OF SYSTEMS: History of pulmonary embolism, pleurisy  Constitutional: No unanticipated weight loss or fevers. No fatigue and malaise.   Integumentary: No rash, photosensitivity, malar rash, livedo reticularis, alopecia and Raynaud's symptoms, sclerodactyly, skin tightening  Eyes: negative for visual disturbance and persistent redness, discharge from eyes   ENT: - No tinnitus, loss of hearing, vertigo, or recurrent ear infections.  - No history of nasal/oral ulcers. - No history of dry eyes/dry mouth  Cardiovascular: No history of pericarditis, chest pain or murmur or palpitations  Respiratory: No shortness of breath, cough or history of interstitial lung disease. No history of tuberculosis or atypical infections. Gastrointestinal: No history of heart burn, dysphagia or esophageal dysmotility. No change in bowel habits or any inflammatory bowel disease. Genitourinary: No history renal disease, miscarriages. Hematologic/Lymphatic: No abnormal bruising or bleeding, swollen lymph nodes. Neurological: No history of headaches, seizure or focal weakness. No history of neuropathies, paresthesias or hyperesthesias, facial droop, diplopia  Psychiatric: No history of bipolar disease, anxiety, depression  Endocrine: Denies any polyuria, polydipsia and osteoporosis  Allergic/Immunologic: No nasal congestion or hives. I have reviewed patients Past medical History, Social History and Family History as mentioned in her chart and this remains unchanged fromprevious.     Past Medical History:   Diagnosis Date    Anemia     Pleurisy     Pulmonary embolism ([facility] [address]     Thyroid disease      Past Surgical History:   Procedure Laterality Date    MOUTH SURGERY      TUBAL LIGATION       Social History     Socioeconomic History    Marital status: Single     Spouse name: Not on file    Number of children: Not on file    Years of education: Not on file    Highest education level: Not on file   Occupational History    Not on file   Social Needs    Financial resource strain: Not on file    Food insecurity     Worry: Not on file     Inability: Not on file    Transportation needs     Medical: Not on file     Non-medical: Not on file   Tobacco Use    Smoking status: Former Smoker     Types: Cigarettes     Quit date: 5/10/2020     Years since quittin.8    Smokeless tobacco: Never Used   Substance and Sexual Activity    Alcohol use: Yes     Comment: occ    Drug use: No    Sexual activity: Not on file   Lifestyle    Physical activity     Days per week: Not on file     Minutes per session: Not on file    Stress: Not on file   Relationships    Social connections     Talks on phone: Not on file     Gets together: Not on file     Attends Muslim service: Not on file     Active member of club or organization: Not on file     Attends meetings of clubs or organizations: Not on file     Relationship status: Not on file    Intimate partner violence     Fear of current or ex partner: Not on file     Emotionally abused: Not on file     Physically abused: Not on file     Forced sexual activity: Not on file   Other Topics Concern    Not on file   Social History Narrative    Not on file     No family history on file. PHYSICAL EXAMINATION:  [ INSTRUCTIONS:  \"[x]\" Indicates a positive item  \"[]\" Indicates a negative item  -- DELETE ALL ITEMS NOT EXAMINED]  Vital Signs: (As obtained by patient/caregiver or practitioner observation)    Blood pressure-  Heart rate-    Respiratory rate-    Temperature-  Pulse oximetry-     Constitutional: [x] Appears well-developed and well-nourished [x] No apparent distress      [] Abnormal-   Mental status  [x] Alert and awake  [x] Oriented to person/place/time [x]Able to follow commands      Eyes:  EOM    [x]  Normal  [] Abnormal-  Sclera  [x]  Normal  [] Abnormal -         Discharge [x]  None visible  [] Abnormal -    HENT:   [x] Normocephalic, atraumatic.   [] Abnormal   [x] Mouth/Throat: Mucous membranes are moist.     External Ears [x] Normal  [] Abnormal-     Neck: [x] No visualized mass     Pulmonary/Chest: [x] Respiratory effort normal.  [x] No visualized signs of difficulty breathing or respiratory distress        [] Abnormal-      Musculoskeletal:   [x] Normal gait with no signs of ataxia         [x] Normal range of motion of neck        [] Abnormal-       Neurological:        [x] No Facial Asymmetry (Cranial nerve 7 motor function) (limited exam to video visit)          [x] No gaze palsy        [] Abnormal-         Skin:        [x] No significant exanthematous lesions or discoloration noted on facial skin         [] Abnormal-            Psychiatric:       [x] Normal Affect [x] No Hallucinations        [] Abnormal-         LABS AND IMAGING  Outside data reviewed and in HPI    Lab Results   Component Value Date    WBC 5.6 01/25/2021    RBC 4.04 01/25/2021    HGB 12.3 01/25/2021    HCT 36.8 01/25/2021     01/25/2021    MCV 91.0 01/25/2021    MCH 30.3 01/25/2021    MCHC 33.3 01/25/2021    RDW 13.4 01/25/2021    LYMPHOPCT 41.4 01/25/2021    MONOPCT 8.5 01/25/2021    BASOPCT 1.1 01/25/2021    MONOSABS 0.5 01/25/2021    LYMPHSABS 2.3 01/25/2021    EOSABS 0.3 01/25/2021    BASOSABS 0.1 01/25/2021       Chemistry        Component Value Date/Time     06/22/2020 1635    K 4.2 06/22/2020 1635    CL 99 06/22/2020 1635    CO2 23 06/22/2020 1635    BUN 16 06/22/2020 1635    CREATININE 1.0 01/25/2021 1304        Component Value Date/Time    CALCIUM 9.9 06/22/2020 1635    ALKPHOS 68 06/22/2020 1635    AST 17 01/25/2021 1304    ALT 13 01/25/2021 1304    BILITOT <0.2 06/22/2020 1635          Lab Results   Component Value Date    SEDRATE 27 (H) 06/22/2020     Lab Results   Component Value Date    CRP 2.1 06/22/2020     Lab Results   Component Value Date    JAY Negative 07/29/2019     Lab Results   Component Value Date    RF 44.0 07/29/2019     Lab Results   Component Value Date    JAY Negative 07/29/2019     No results found for: DSDNAG, DSDNAIGGIFA  No results found for: Carlos Kitten  No results found for: SMAB, RNPAB  No results found for: CENTABIGG  No results found for: C3, C4, ACE  Lab Results   Component Value Date    VITD25 9.9 07/29/2019     No results found for: Francheska Diver  Lab Results   Component Value Date    MDCPQKCL52 1123 (H) 07/29/2019     Lab Results   Component Value Date    TSHFT4 0.60 07/29/2019     Lab Results   Component Value Date VITD25 9.9 (L) 07/29/2019       Radiology:    Bone scan 5/13/19  IMPRESSION:    1.  Symmetric radiotracer activity in the rib cage as well as along the cervical, thoracic, and lumbar spine. No clear etiology for the patient's chest pain is demonstrated on today's bone scan. 2.  Normal three-phase evaluation of the bony pelvis and hip joints. There is a small amount of likely degenerative uptake in the left knee joint and in the right knee joint. 3.  Additional asymmetric likely degenerative uptake in the right acromioclavicular joint in comparison to the left acromioclavicular joint. There is also uptake suggestive of degenerative change in the right and the left hand and wrist.Workstation ID:[id number]      EMG 5/13/19     Normal study of the bilateral lower limbs and lumbar paraspinal   muscles. 1.  No acute lumbosacral radiculopathy from L2-S1 affecting the   bilateral lower limbs. 2.  No acute lumbar plexopathy affecting the bilateral lower   limbs. 3.  No peripheral neuropathy affecting the bilateral lower limbs. 4.  No peroneal entrapment or other focal entrapment neuropathy   affecting the bilateral lower limbs. 5.  No myopathy affecting the bilateral lower        Xray lumbar spine:          IMPRESSION:           Minimal degenerative changes of the lumbar spine with no acute findings       ASSESSMENT AND PLAN      Assessment/Plan:      ASSESSMENT:    1. Rheumatoid arthritis involving multiple sites with positive rheumatoid factor ([facility] [address]    2. High risk medication use    3. Myofascial pain    4. SOB (shortness of breath)        PLAN:   No orders of the defined types were placed in this encounter. 1. Rheumatoid arthritis involving multiple sites with positive rheumatoid factor (HCC)  RF, CCP positive. Elevated ESR and CRP  Off of Plaquenil and prednisone. Did not notice any improvement on Plaquenil  Stable. Continue methotrexate 8 tablets once a week    2.  Myofascial pain  Chest pain and lower extremity pain most likely secondary to myofascial pain/costochondritis. Chest pain has improved but she continues to get on and off lower extremity pain which improved with Flexeril. Extensive cardiac work-up unremarkable. Lung perfusion scan no evidence of pulmonary embolism. Echocardiogram and cardiac enzymes normal  Off of Flexeril, no significant improvement  Will increase Celebrex to 100 mg twice a day  -Strongly emphasized on low impact aerobic and stretching exercises including biking, swimming, walking, yoga or tiachi classes  -deep breathing and relaxation exercises   -mindfulness techniques  -Counseled on Sleep hygiene   -Advised to drink 64 oz of water   -Limit caffeine intake to 8 oz/day     3. SOB (shortness of breath)  Recent lung perfusion scan normal.  History of blood clots. She also had CT chest in February 2020 without any evidence of pulmonary embolism. PFT was normal.  She was seen by pulmonologist who recommended once a year PFT. 4. High risk medication use  Recommend yearly flu vaccine and pneumonia vaccine. Labs every 3 months    The patient indicates understanding of these issues and agrees with the plan. Return in about 3 months (around 6/5/2021). The risks and benefits of my recommendations, as well as other treatment options, benefits and side effects werediscussed with the patient. All questions were answered. ######################################################################    I thank you for giving me theopportunity to participate in [facility] care. If you have any questions or concerns please feel free to contact me. I look forward to following  Ame along with you. Electronically signed by: Mushtaq García MD, 3/5/2021 11:21 AM    Documentation was done using voice recognition dragon software. Every effort was made to ensure accuracy;however, inadvertent unintentional computerized transcription errors may be present. normal... Well appearing, well nourished, awake, alert, oriented to person, place, time/situation and in no apparent distress.

## 2021-03-05 NOTE — PATIENT INSTRUCTIONS
Increase celebrex to 100 mg twice a day   Continue methotrexTE 8 TABS ONCE A WEEK   Labs every 3 months   Flu and pneumonia shot

## 2021-06-09 ENCOUNTER — TELEPHONE (OUTPATIENT)
Dept: INTERNAL MEDICINE CLINIC | Age: 46
End: 2021-06-09

## 2021-06-09 NOTE — TELEPHONE ENCOUNTER
Called pt. Offered appt for tomorrow, but she has prior appts. Made first available appt in August and added pt to wait list for any cancellations.

## 2021-06-09 NOTE — TELEPHONE ENCOUNTER
Please call and let her know that we do not have anything available until August.  We can see her at her next available opening

## 2021-06-09 NOTE — TELEPHONE ENCOUNTER
Patient called to reschedule an appt that she just realized she missed yesterday. I tried to reschedule her but I was not able to find anything prior to August. Please call pt back to reschedule.

## 2021-06-24 DIAGNOSIS — Z79.899 HIGH RISK MEDICATION USE: ICD-10-CM

## 2021-06-24 LAB
ALT SERPL-CCNC: <5 U/L (ref 10–40)
AST SERPL-CCNC: 18 U/L (ref 15–37)
BASOPHILS ABSOLUTE: 0 K/UL (ref 0–0.2)
BASOPHILS RELATIVE PERCENT: 0.4 %
CREAT SERPL-MCNC: 1.1 MG/DL (ref 0.6–1.1)
EOSINOPHILS ABSOLUTE: 0.4 K/UL (ref 0–0.6)
EOSINOPHILS RELATIVE PERCENT: 4.7 %
GFR AFRICAN AMERICAN: >60
GFR NON-AFRICAN AMERICAN: 53
HCT VFR BLD CALC: 35.7 % (ref 36–48)
HEMOGLOBIN: 12 G/DL (ref 12–16)
LYMPHOCYTES ABSOLUTE: 2.2 K/UL (ref 1–5.1)
LYMPHOCYTES RELATIVE PERCENT: 25.9 %
MCH RBC QN AUTO: 30.7 PG (ref 26–34)
MCHC RBC AUTO-ENTMCNC: 33.6 G/DL (ref 31–36)
MCV RBC AUTO: 91.3 FL (ref 80–100)
MONOCYTES ABSOLUTE: 0.5 K/UL (ref 0–1.3)
MONOCYTES RELATIVE PERCENT: 5.6 %
NEUTROPHILS ABSOLUTE: 5.4 K/UL (ref 1.7–7.7)
NEUTROPHILS RELATIVE PERCENT: 63.4 %
PDW BLD-RTO: 13.5 % (ref 12.4–15.4)
PLATELET # BLD: 325 K/UL (ref 135–450)
PMV BLD AUTO: 8.5 FL (ref 5–10.5)
RBC # BLD: 3.91 M/UL (ref 4–5.2)
WBC # BLD: 8.5 K/UL (ref 4–11)

## 2021-08-09 ENCOUNTER — OFFICE VISIT (OUTPATIENT)
Dept: RHEUMATOLOGY | Age: 46
End: 2021-08-09
Payer: COMMERCIAL

## 2021-08-09 VITALS
WEIGHT: 194 LBS | BODY MASS INDEX: 32.28 KG/M2 | SYSTOLIC BLOOD PRESSURE: 130 MMHG | DIASTOLIC BLOOD PRESSURE: 84 MMHG | HEART RATE: 70 BPM

## 2021-08-09 DIAGNOSIS — R06.02 SOB (SHORTNESS OF BREATH): ICD-10-CM

## 2021-08-09 DIAGNOSIS — M79.18 MYOFASCIAL PAIN: ICD-10-CM

## 2021-08-09 DIAGNOSIS — M77.01 GOLFERS ELBOW OF RIGHT UPPER EXTREMITY: ICD-10-CM

## 2021-08-09 DIAGNOSIS — M17.0 PRIMARY OSTEOARTHRITIS OF BOTH KNEES: ICD-10-CM

## 2021-08-09 DIAGNOSIS — Z79.899 HIGH RISK MEDICATION USE: ICD-10-CM

## 2021-08-09 DIAGNOSIS — M05.79 RHEUMATOID ARTHRITIS INVOLVING MULTIPLE SITES WITH POSITIVE RHEUMATOID FACTOR (HCC): Primary | ICD-10-CM

## 2021-08-09 PROCEDURE — 1036F TOBACCO NON-USER: CPT | Performed by: INTERNAL MEDICINE

## 2021-08-09 PROCEDURE — 99214 OFFICE O/P EST MOD 30 MIN: CPT | Performed by: INTERNAL MEDICINE

## 2021-08-09 PROCEDURE — G8428 CUR MEDS NOT DOCUMENT: HCPCS | Performed by: INTERNAL MEDICINE

## 2021-08-09 PROCEDURE — G8417 CALC BMI ABV UP PARAM F/U: HCPCS | Performed by: INTERNAL MEDICINE

## 2021-08-09 RX ORDER — CYCLOBENZAPRINE HCL 10 MG
10 TABLET ORAL 3 TIMES DAILY PRN
COMMUNITY
Start: 2021-07-20

## 2021-08-09 RX ORDER — ACETAMINOPHEN 500 MG
TABLET ORAL
COMMUNITY

## 2021-08-09 NOTE — PROGRESS NOTES
2021     Patient Name: Jasmin Horvath  : 1975  Medical Record: [id number]    MEDICATIONS  Current Outpatient Medications   Medication Sig Dispense Refill    cyclobenzaprine (FLEXERIL) 10 MG tablet Take 10 mg by mouth Three times daily as needed      Elastic Bandages & Supports (ELBOW BRACE) MISC Use as needed 1 each 0    Plecanatide 3 MG TABS Take 1 tablet by mouth daily      famotidine (PEPCID) 40 MG tablet Take 40 mg by mouth nightly as needed       Omega-3 Fatty Acids (OMEGA-3 FISH OIL) 1000 MG CAPS 2 times daily       methotrexate (RHEUMATREX) 2.5 MG chemo tablet Take 8 tablets by mouth every 7 days       vitamin B-12 (CYANOCOBALAMIN) 1000 MCG tablet Take 1,000 mcg by mouth daily      celecoxib (CELEBREX) 100 MG capsule Take 100 mg by mouth daily      vitamin D (CHOLECALCIFEROL) 50 MCG ( UT) TABS tablet TAKE ONE TABLET BY MOUTH DAILY AFTER FINISHING 12 WEEK COURSE 30 tablet 10    omeprazole (PRILOSEC) 40 MG delayed release capsule Take 40 mg by mouth daily       fenofibrate (TRICOR) 145 MG tablet Take 145 mg by mouth daily      folic acid (FOLVITE) 1 MG tablet Take 1 mg by mouth daily      levothyroxine (SYNTHROID) 50 MCG tablet Take 50 mcg by mouth Daily      acetaminophen (TYLENOL) 500 MG tablet Take by mouth       No current facility-administered medications for this visit. ALLERGIES  Allergies   Allergen Reactions    Icosapent Ethyl Itching    Iv Contrast [Iodides] Rash    Other Rash     Vascepa          Comments  No specialty comments available. Background history:  Jasmin Horvath is a 48 y.o. female with past medical history of hypothyroidism, dyslipidemia, anemia, pleurisy, pulmonary embolism who is being seen for follow up evaluation of  bilateral lower extremity pain and positive rheumatoid factor. She has pain in both lower extremities and bilateral feet which started 2 years ago and is progressively getting worse.   She describes her pain as 10 out of 10, aching/sore, on daily basis, without any significant relieving or aggravating factors. She also has on and off low back pain twice a week lasting for 20 minutes at a time. Pain intermittently radiates to right thigh. She has occasional tingling and numbness in legs. She denies any weakness. She denies any bowel or bladder dysfunction. She denies any change in the symptoms with exercise or rest.  She was placed on Cymbalta by primary care physician which caused jitteriness. She takes ibuprofen over-the-counter 600 mg as needed without any significant relief in pain. She gets occasional achiness in her hands with overactivity. She denies any swelling. She has a morning stiffness lasting for few minutes. She has intermittent swelling in her ankles. She also has a history of pulmonary embolism in 2015. She was on warfarin for 2 years. She denies family history of rheumatoid arthritis. She denies psoriasis, inflammatory bowel disease, inflammatory back pain, dactylitis, enthesitis, tenosynovitis and uveitis. She had bilateral lower extremity EMG which was normal.  She also had bone scan which showed mild degenerative changes. She had blood work which showed RF 44.8    Interim history: She presents for follow-up of rheumatoid arthritis and myofascial pain. She is on methotrexate 8 tablets once a week. She has on and off pain in bilateral knees which gets worse going up and down the steps associated with stiffness. Morning stiffness last for 5 minutes. Both knees sometimes give out. She takes Celebrex 100 mg daily. She also has pain on the medial side of the right elbow which gets worse with twisting her elbow. She denies any trauma or swelling. She denies any other joint pain or swelling or stiffness. Shortness of breath is improved. She recently saw her pulmonologist who recommended once a year PFT for monitoring. She had perfusion lung scan which was negative for pulmonary embolism.   PFT was normal.  She also had cardiac work-up which came back normal.    She also has myofascial pain involving bilateral lower extremities. she takes Flexeril as needed which also helps. She had EMG of bilateral lower extremities which was normal.  She also had a bone scan which showed mild degenerative changes. Hand and foot x-ray show mild degenerative changes. Her blood work showed positive RF, CCP and low vitamin D. She denies any side effects with methotrexate. She denies any recent fevers or infections. She is able to maintain her ADLs without limitations. HPI  Review of Systems    REVIEW OF SYSTEMS: History of pulmonary embolism, pleurisy  Constitutional: No unanticipated weight loss or fevers. No fatigue and malaise. Integumentary: No rash, photosensitivity, malar rash, livedo reticularis, alopecia and Raynaud's symptoms, sclerodactyly, skin tightening  Eyes: negative for visual disturbance and persistent redness, discharge from eyes   ENT: - No tinnitus, loss of hearing, vertigo, or recurrent ear infections.  - No history of nasal/oral ulcers. - No history of dry eyes/dry mouth  Cardiovascular: No history of pericarditis, chest pain or murmur or palpitations  Respiratory: No shortness of breath, cough or history of interstitial lung disease. No history of tuberculosis or atypical infections. Gastrointestinal: No history of heart burn, dysphagia or esophageal dysmotility. No change in bowel habits or any inflammatory bowel disease. Genitourinary: No history renal disease, miscarriages. Hematologic/Lymphatic: No abnormal bruising or bleeding, swollen lymph nodes. Neurological: No history of headaches, seizure or focal weakness. No history of neuropathies, paresthesias or hyperesthesias, facial droop, diplopia  Psychiatric: No history of bipolar disease, anxiety, depression  Endocrine: Denies any polyuria, polydipsia and osteoporosis  Allergic/Immunologic: No nasal congestion or hives.         I PHYSICAL EXAMINATION:    Vitals:    08/09/21 1039   BP: 130/84   Pulse: 70   Weight: 194 lb (88 kg)       Physical Exam  Constitutional:  Well developed, well nourished, no acute distress, non-toxic appearance   Musculoskeletal:    RIGHT  Swell  Tender  ROM  LEFT  Swell  Tender  ROM    DIP2  0  0  FULL   0  0  FULL    DIP3  0  0  FULL   0  0  FULL    DIP4  0  0  FULL   0  0  FULL    DIP5  0  0  FULL   0  0  FULL    PIP1  0  0  FULL   0  0  FULL    PIP2  0  0  FULL   0  0  FULL    PIP3  0  0  FULL   0  0  FULL    PIP4  0  0  FULL   0  0  FULL    PIP5  0  0  FULL   0  0  FULL    MCP1  0  0  FULL   0  0  FULL    MCP2  0  0  FULL   0  0  FULL    MCP3  0  0  FULL   0  0  FULL    MCP4  0  0  FULL   0  0  FULL    MCP5  0  0  FULL   0  0  FULL    Wrist  0  0  FULL   0  0  FULL    Elbow  0  ++ FULL   0  0  FULL    Shouldr  0  0  FULL   0  0  FULL    Hip  0  0  FULL   0  0  FULL    Knee  0  0   crepitus  0  0   crepitus   Ankle  0  + FULL   0  + FULL    MTP1  0  0  FULL   0  0  FULL    MTP2  0  + FULL   0  + FULL    MTP3  0  +  FULL   0  +  FULL    MTP4  0  +  FULL   0  +  FULL    MTP5  0  +  FULL   0  +  FULL    IP1  0  0  FULL   0  0  FULL    IP2  0  0  FULL   0  0  FULL    IP3  0  0  FULL   0  0  FULL    IP4  0  0  FULL   0  0  FULL    IP5  0  0  FULL   0 0 FULL                                             Right            Left                                   Tender Tender    Costochondral  + +   Low cervical + +   suboccipital + +   Trapezius  + +   Supraspinatus  + +   Lateral epicondyl + +   Gluteal + +   Greater trochanter  + +   knees + +     Tenderness to palpation bilateral lower extremities  Tenderness to palpation medial side of the right elbow. Pain on the medial side of the elbow with forced flexion of the right wrist  Ambulates without assistance, normal gait  Neck: Full ROM, no tenderness,supple   Back- no tenderness.   Eyes:  PERRL, extra ocular movements intact, conjunctiva normal   HEENT: Atraumatic, normocephalic, external ears normal, oropharynx moist, no pharyngeal exudates. CVS: Tender to palpation bilateral chest area mostly near costochondral junction  Respiratory:  No respiratory distress  GI:  Soft, nondistended, normal bowel sounds, nontender, noorganomegaly, no mass, no rebound, no guarding   :  No costovertebral angle tenderness   Integument:  Well hydrated, no rash or telangiectasias  Lymphatic:  No lymphadenopathy noted   Neurologic:   Alert & oriented x 3, CN 2-12 normal, no focal deficits noted. Sensations Intact. Muscle strength 5/5 proximallyand distally in upper and lower extremities.    Psychiatric:  Speech and behavior appropriate         LABS AND IMAGING  Outside data reviewed and in HPI    Lab Results   Component Value Date    WBC 8.5 06/24/2021    RBC 3.91 06/24/2021    HGB 12.0 06/24/2021    HCT 35.7 06/24/2021     06/24/2021    MCV 91.3 06/24/2021    MCH 30.7 06/24/2021    MCHC 33.6 06/24/2021    RDW 13.5 06/24/2021    LYMPHOPCT 25.9 06/24/2021    MONOPCT 5.6 06/24/2021    BASOPCT 0.4 06/24/2021    MONOSABS 0.5 06/24/2021    LYMPHSABS 2.2 06/24/2021    EOSABS 0.4 06/24/2021    BASOSABS 0.0 06/24/2021       Chemistry        Component Value Date/Time     06/22/2020 1635    K 4.2 06/22/2020 1635    CL 99 06/22/2020 1635    CO2 23 06/22/2020 1635    BUN 16 06/22/2020 1635    CREATININE 1.1 06/24/2021 1059        Component Value Date/Time    CALCIUM 9.9 06/22/2020 1635    ALKPHOS 68 06/22/2020 1635    AST 18 06/24/2021 1059    ALT <5 (L) 06/24/2021 1059    BILITOT <0.2 06/22/2020 1635          Lab Results   Component Value Date    SEDRATE 27 (H) 06/22/2020     Lab Results   Component Value Date    CRP 2.1 06/22/2020     Lab Results   Component Value Date    JAY Negative 07/29/2019     Lab Results   Component Value Date    RF 44.0 07/29/2019     Lab Results   Component Value Date    JAY Negative 07/29/2019     No results found for: DSDNAG, DSDNAIGGIFA  No results found for: Peggyann Maxcy  No results found for: SMAB, RNPAB  No results found for: CENTABIGG  No results found for: C3, C4, ACE  Lab Results   Component Value Date    VITD25 9.9 07/29/2019     No results found for: Giorgio Terrell  Lab Results   Component Value Date    XSZLEYVF84 1123 (H) 07/29/2019     Lab Results   Component Value Date    TSHFT4 0.60 07/29/2019     Lab Results   Component Value Date    VITD25 9.9 (L) 07/29/2019       Radiology:    Bone scan 5/13/19  IMPRESSION:    1.  Symmetric radiotracer activity in the rib cage as well as along the cervical, thoracic, and lumbar spine. No clear etiology for the patient's chest pain is demonstrated on today's bone scan. 2.  Normal three-phase evaluation of the bony pelvis and hip joints. There is a small amount of likely degenerative uptake in the left knee joint and in the right knee joint. 3.  Additional asymmetric likely degenerative uptake in the right acromioclavicular joint in comparison to the left acromioclavicular joint. There is also uptake suggestive of degenerative change in the right and the left hand and wrist.Workstation ID:[id number]      EMG 5/13/19     Normal study of the bilateral lower limbs and lumbar paraspinal   muscles. 1.  No acute lumbosacral radiculopathy from L2-S1 affecting the   bilateral lower limbs. 2.  No acute lumbar plexopathy affecting the bilateral lower   limbs. 3.  No peripheral neuropathy affecting the bilateral lower limbs. 4.  No peroneal entrapment or other focal entrapment neuropathy   affecting the bilateral lower limbs. 5.  No myopathy affecting the bilateral lower        Xray lumbar spine:          IMPRESSION:           Minimal degenerative changes of the lumbar spine with no acute findings       ASSESSMENT AND PLAN      Assessment/Plan:      ASSESSMENT:    1. Rheumatoid arthritis involving multiple sites with positive rheumatoid factor ([address]    2. High risk medication use    3. Myofascial pain    4. Golfers elbow of right upper extremity    5. Primary osteoarthritis of both knees    6. SOB (shortness of breath)        PLAN:   Orders Placed This Encounter   Medications    Elastic Bandages & Supports (ELBOW BRACE) MISC     Sig: Use as needed     Dispense:  1 each     Refill:  0     1. Rheumatoid arthritis involving multiple sites with positive rheumatoid factor (HCC)  RF, CCP positive. Elevated ESR and CRP  Off of Plaquenil and prednisone. Did not notice any improvement on Plaquenil  Stable. Continue methotrexate 8 tablets once a week    2. Myofascial pain  Chest pain and lower extremity pain most likely secondary to myofascial pain/costochondritis. Chest pain has improved but she continues to get on and off lower extremity pain which improved with Flexeril. Extensive cardiac work-up unremarkable. Lung perfusion scan no evidence of pulmonary embolism. Echocardiogram and cardiac enzymes normal  Stable  Flexeril as needed  -Strongly emphasized on low impact aerobic and stretching exercises including biking, swimming, walking, yoga or tiachi classes  -deep breathing and relaxation exercises   -mindfulness techniques  -Counseled on Sleep hygiene   -Advised to drink 64 oz of water   -Limit caffeine intake to 8 oz/day     3. High risk medication use  Recommend yearly flu vaccine and pneumonia vaccine. Labs every 3 months    4. Golfers elbow of right upper extremity  Most likely golfers elbow/medial epicondylitis  Will provide with elbow brace  Increase elbow external milligram twice a day  Refer to physical therapy  If no improvement will do Kenalog injection  - Elastic Bandages & Supports (ELBOW BRACE) MISC; Use as needed  Dispense: 1 each; Refill: 0    5. Primary osteoarthritis of both knees  Knee symptoms most likely due to osteoarthritis. Will obtain bilateral knee x-rays and refer to physical therapy and increase Celebrex 200 mg twice a day  - XR KNEE RIGHT (3 VIEWS);  Future  - XR KNEE LEFT (3 VIEWS); Future  - External Referral To Physical Therapy    6. SOB (shortness of breath)  Recent lung perfusion scan normal.  History of blood clots. She also had CT chest in February 2020 without any evidence of pulmonary embolism. PFT was normal.  She was seen by pulmonologist who recommended once a year PFT. The patient indicates understanding of these issues and agrees with the plan. Return in about 3 months (around 11/9/2021). The risks and benefits of my recommendations, as well as other treatment options, benefits and side effects werediscussed with the patient. All questions were answered. ######################################################################    I thank you for giving me theopportunity to participate in [facility] care. If you have any questions or concerns please feel free to contact me. I look forward to following  Ame along with you. Electronically signed by: Matty Lockwood MD, MD, 8/9/2021 11:09 AM    Documentation was done using voice recognition dragon software. Every effort was made to ensure accuracy;however, inadvertent unintentional computerized transcription errors may be present.

## 2021-10-12 RX ORDER — FOLIC ACID 1 MG/1
1 TABLET ORAL DAILY
Qty: 30 TABLET | Refills: 5 | Status: SHIPPED | OUTPATIENT
Start: 2021-10-12

## 2022-07-21 RX ORDER — FOLIC ACID 1 MG/1
TABLET ORAL
Qty: 30 TABLET | Refills: 5 | OUTPATIENT
Start: 2022-07-21